# Patient Record
Sex: MALE | Race: WHITE | Employment: FULL TIME | URBAN - METROPOLITAN AREA
[De-identification: names, ages, dates, MRNs, and addresses within clinical notes are randomized per-mention and may not be internally consistent; named-entity substitution may affect disease eponyms.]

---

## 2022-09-05 ENCOUNTER — HOSPITAL ENCOUNTER (EMERGENCY)
Age: 37
Discharge: HOME | End: 2022-09-05
Attending: STUDENT IN AN ORGANIZED HEALTH CARE EDUCATION/TRAINING PROGRAM
Payer: COMMERCIAL

## 2022-09-05 ENCOUNTER — APPOINTMENT (OUTPATIENT)
Dept: CT IMAGING | Age: 37
End: 2022-09-05
Payer: COMMERCIAL

## 2022-09-05 VITALS
BODY MASS INDEX: 42.26 KG/M2 | HEART RATE: 80 BPM | OXYGEN SATURATION: 97 % | DIASTOLIC BLOOD PRESSURE: 91 MMHG | RESPIRATION RATE: 16 BRPM | HEIGHT: 72 IN | SYSTOLIC BLOOD PRESSURE: 150 MMHG | WEIGHT: 312 LBS | TEMPERATURE: 98.9 F

## 2022-09-05 DIAGNOSIS — N20.1 URETEROLITHIASIS: Primary | ICD-10-CM

## 2022-09-05 DIAGNOSIS — R16.1 SPLENOMEGALY: ICD-10-CM

## 2022-09-05 LAB
A/G RATIO: 1.6 (ref 1.1–2.2)
ALBUMIN SERPL-MCNC: 4.2 G/DL (ref 3.4–5)
ALP BLD-CCNC: 69 U/L (ref 40–129)
ALT SERPL-CCNC: 36 U/L (ref 10–40)
ANION GAP SERPL CALCULATED.3IONS-SCNC: 12 MMOL/L (ref 3–16)
AST SERPL-CCNC: 28 U/L (ref 15–37)
BASOPHILS ABSOLUTE: 0.1 K/UL (ref 0–0.2)
BASOPHILS RELATIVE PERCENT: 0.6 %
BILIRUB SERPL-MCNC: 0.8 MG/DL (ref 0–1)
BILIRUBIN URINE: NEGATIVE
BLOOD, URINE: ABNORMAL
BUN BLDV-MCNC: 16 MG/DL (ref 7–20)
CALCIUM SERPL-MCNC: 8.6 MG/DL (ref 8.3–10.6)
CHLORIDE BLD-SCNC: 102 MMOL/L (ref 99–110)
CLARITY: CLEAR
CO2: 24 MMOL/L (ref 21–32)
COLOR: YELLOW
CREAT SERPL-MCNC: 1.2 MG/DL (ref 0.9–1.3)
EOSINOPHILS ABSOLUTE: 0 K/UL (ref 0–0.6)
EOSINOPHILS RELATIVE PERCENT: 0.5 %
GFR AFRICAN AMERICAN: >60
GFR NON-AFRICAN AMERICAN: >60
GLUCOSE BLD-MCNC: 167 MG/DL (ref 70–99)
GLUCOSE URINE: NEGATIVE MG/DL
HCT VFR BLD CALC: 41 % (ref 40.5–52.5)
HEMOGLOBIN: 14.3 G/DL (ref 13.5–17.5)
KETONES, URINE: NEGATIVE MG/DL
LEUKOCYTE ESTERASE, URINE: NEGATIVE
LIPASE: 48 U/L (ref 13–60)
LYMPHOCYTES ABSOLUTE: 1.1 K/UL (ref 1–5.1)
LYMPHOCYTES RELATIVE PERCENT: 12.1 %
MCH RBC QN AUTO: 29.9 PG (ref 26–34)
MCHC RBC AUTO-ENTMCNC: 35 G/DL (ref 31–36)
MCV RBC AUTO: 85.6 FL (ref 80–100)
MICROSCOPIC EXAMINATION: YES
MONOCYTES ABSOLUTE: 0.5 K/UL (ref 0–1.3)
MONOCYTES RELATIVE PERCENT: 5.2 %
MUCUS: ABNORMAL /LPF
NEUTROPHILS ABSOLUTE: 7.4 K/UL (ref 1.7–7.7)
NEUTROPHILS RELATIVE PERCENT: 81.6 %
NITRITE, URINE: NEGATIVE
PDW BLD-RTO: 13.1 % (ref 12.4–15.4)
PH UA: 6 (ref 5–8)
PLATELET # BLD: 218 K/UL (ref 135–450)
PMV BLD AUTO: 8 FL (ref 5–10.5)
POTASSIUM REFLEX MAGNESIUM: 3.9 MMOL/L (ref 3.5–5.1)
PROTEIN UA: NEGATIVE MG/DL
RBC # BLD: 4.79 M/UL (ref 4.2–5.9)
RBC UA: ABNORMAL /HPF (ref 0–4)
SODIUM BLD-SCNC: 138 MMOL/L (ref 136–145)
SPECIFIC GRAVITY UA: <=1.005 (ref 1–1.03)
TOTAL PROTEIN: 6.8 G/DL (ref 6.4–8.2)
URINE TYPE: ABNORMAL
UROBILINOGEN, URINE: 0.2 E.U./DL
WBC # BLD: 9.1 K/UL (ref 4–11)
WBC UA: ABNORMAL /HPF (ref 0–5)

## 2022-09-05 PROCEDURE — 85025 COMPLETE CBC W/AUTO DIFF WBC: CPT

## 2022-09-05 PROCEDURE — 83690 ASSAY OF LIPASE: CPT

## 2022-09-05 PROCEDURE — 99285 EMERGENCY DEPT VISIT HI MDM: CPT

## 2022-09-05 PROCEDURE — 6360000004 HC RX CONTRAST MEDICATION: Performed by: STUDENT IN AN ORGANIZED HEALTH CARE EDUCATION/TRAINING PROGRAM

## 2022-09-05 PROCEDURE — 6360000002 HC RX W HCPCS: Performed by: STUDENT IN AN ORGANIZED HEALTH CARE EDUCATION/TRAINING PROGRAM

## 2022-09-05 PROCEDURE — 81001 URINALYSIS AUTO W/SCOPE: CPT

## 2022-09-05 PROCEDURE — 96375 TX/PRO/DX INJ NEW DRUG ADDON: CPT

## 2022-09-05 PROCEDURE — 80053 COMPREHEN METABOLIC PANEL: CPT

## 2022-09-05 PROCEDURE — 2580000003 HC RX 258: Performed by: STUDENT IN AN ORGANIZED HEALTH CARE EDUCATION/TRAINING PROGRAM

## 2022-09-05 PROCEDURE — 74177 CT ABD & PELVIS W/CONTRAST: CPT

## 2022-09-05 PROCEDURE — 96374 THER/PROPH/DIAG INJ IV PUSH: CPT

## 2022-09-05 RX ORDER — KETOROLAC TROMETHAMINE 30 MG/ML
15 INJECTION, SOLUTION INTRAMUSCULAR; INTRAVENOUS ONCE
Status: COMPLETED | OUTPATIENT
Start: 2022-09-05 | End: 2022-09-05

## 2022-09-05 RX ORDER — ONDANSETRON 2 MG/ML
4 INJECTION INTRAMUSCULAR; INTRAVENOUS EVERY 6 HOURS PRN
Status: DISCONTINUED | OUTPATIENT
Start: 2022-09-05 | End: 2022-09-05 | Stop reason: HOSPADM

## 2022-09-05 RX ORDER — ESOMEPRAZOLE MAGNESIUM 20 MG/1
20 FOR SUSPENSION ORAL DAILY
COMMUNITY

## 2022-09-05 RX ORDER — IBUPROFEN 600 MG/1
600 TABLET ORAL EVERY 6 HOURS PRN
Qty: 360 TABLET | Refills: 1 | Status: SHIPPED | OUTPATIENT
Start: 2022-09-05 | End: 2022-09-05

## 2022-09-05 RX ORDER — 0.9 % SODIUM CHLORIDE 0.9 %
1000 INTRAVENOUS SOLUTION INTRAVENOUS ONCE
Status: COMPLETED | OUTPATIENT
Start: 2022-09-05 | End: 2022-09-05

## 2022-09-05 RX ORDER — OXYCODONE HYDROCHLORIDE AND ACETAMINOPHEN 5; 325 MG/1; MG/1
1 TABLET ORAL EVERY 6 HOURS PRN
Qty: 20 TABLET | Refills: 0 | Status: SHIPPED | OUTPATIENT
Start: 2022-09-05 | End: 2022-09-10

## 2022-09-05 RX ORDER — IBUPROFEN 600 MG/1
600 TABLET ORAL EVERY 6 HOURS PRN
Qty: 360 TABLET | Refills: 1 | Status: SHIPPED | OUTPATIENT
Start: 2022-09-05

## 2022-09-05 RX ORDER — SERTRALINE HYDROCHLORIDE 25 MG/1
50 TABLET, FILM COATED ORAL DAILY
COMMUNITY
End: 2022-10-13 | Stop reason: DRUGHIGH

## 2022-09-05 RX ORDER — OXYCODONE HYDROCHLORIDE AND ACETAMINOPHEN 5; 325 MG/1; MG/1
1 TABLET ORAL EVERY 6 HOURS PRN
Qty: 20 TABLET | Refills: 0 | Status: SHIPPED | OUTPATIENT
Start: 2022-09-05 | End: 2022-09-05

## 2022-09-05 RX ADMIN — IOPAMIDOL 75 ML: 755 INJECTION, SOLUTION INTRAVENOUS at 08:10

## 2022-09-05 RX ADMIN — ONDANSETRON HYDROCHLORIDE 4 MG: 2 INJECTION, SOLUTION INTRAMUSCULAR; INTRAVENOUS at 07:43

## 2022-09-05 RX ADMIN — KETOROLAC TROMETHAMINE 15 MG: 30 INJECTION, SOLUTION INTRAMUSCULAR; INTRAVENOUS at 07:43

## 2022-09-05 RX ADMIN — SODIUM CHLORIDE 1000 ML: 9 INJECTION, SOLUTION INTRAVENOUS at 07:42

## 2022-09-05 ASSESSMENT — PAIN DESCRIPTION - DESCRIPTORS: DESCRIPTORS: STABBING

## 2022-09-05 ASSESSMENT — PAIN - FUNCTIONAL ASSESSMENT: PAIN_FUNCTIONAL_ASSESSMENT: 0-10

## 2022-09-05 ASSESSMENT — PAIN SCALES - GENERAL
PAINLEVEL_OUTOF10: 2
PAINLEVEL_OUTOF10: 7

## 2022-09-05 ASSESSMENT — PAIN DESCRIPTION - PAIN TYPE: TYPE: ACUTE PAIN

## 2022-09-05 ASSESSMENT — PAIN DESCRIPTION - LOCATION: LOCATION: FLANK

## 2022-09-05 ASSESSMENT — PAIN DESCRIPTION - ORIENTATION: ORIENTATION: LEFT

## 2022-09-05 NOTE — ED PROVIDER NOTES
Emergency Department Encounter    Patient: Arsen Redd  MRN: [id number]  : 1985  Date of Evaluation: 2022  ED Provider:  Ethel Castillo MD    Triage Chief Complaint:   Flank Pain (Left side flank pain onset 0500, denies urinary symptoms)    [place]:  Arsen Redd is a 44 y.o. male presenting with left flank pain started at 5 AM.  Patient states he was awoken from sleep at 5 AM with left flank pain. Denies any recent falls or trauma. States the pain is moderate severe, constant, stabbing, worse with palpation without relieving factors. Denies any urinary symptoms include dysuria, increased frequency, urgency, hematuria. Denies diarrhea or constipation or blood or melena stools. States he has had some nausea without vomiting. Denies fevers or chills. Denies alcohol or drug use. Denies any central back pain, radiation of pain in the lower extremities, bowel or bladder changes, motor or sensory changes or lower extremities and states it is not in his central spine but over the left flank. Denies any chest pain, shortness of breath, cough, sputum production. Denies headache, blurred vision, focal deficits or motor or sensory changes. ROS - see HPI, below listed is current ROS at time of my eval:  At least 14 systems reviewed, negative other HPI    No past medical history on file. No past surgical history on file. No family history on file.   Social History     Socioeconomic History    Marital status:      Spouse name: Not on file    Number of children: Not on file    Years of education: Not on file    Highest education level: Not on file   Occupational History    Not on file   Tobacco Use    Smoking status: Not on file    Smokeless tobacco: Not on file   Substance and Sexual Activity    Alcohol use: Not on file    Drug use: Not on file    Sexual activity: Not on file   Other Topics Concern    Not on file   Social History Narrative    Not on file     Social Determinants of Health Financial Resource Strain: Not on file   Food Insecurity: Not on file   Transportation Needs: Not on file   Physical Activity: Not on file   Stress: Not on file   Social Connections: Not on file   Intimate Partner Violence: Not on file   Housing Stability: Not on file     Current Facility-Administered Medications   Medication Dose Route Frequency Provider Last Rate Last Admin    0.9 % sodium chloride bolus  1,000 mL IntraVENous Once Hood Mcmillan MD        ketorolac (TORADOL) injection 15 mg  15 mg IntraVENous Once Hood Mcmillan MD         No current outpatient medications on file. Not on File    Nursing Notes Reviewed    Physical Exam:  Triage VS:    ED Triage Vitals [09/05/22 0728]   Enc Vitals Group      BP (!) 159/102      Heart Rate 75      Resp 18      Temp       Temp src       SpO2 99 %      Weight (!) 312 lb (141.5 kg)      Height 6' (1.829 m)      Head Circumference       Peak Flow       Pain Score       Pain Loc       Pain Edu? Excl. in [address]? My pulse ox interpretation is - normal    General appearance:  No acute distress. Skin:  Warm. Dry. Eye:  Extraocular movements intact. Ears, nose, mouth and throat:  Oral mucosa moist   Neck:  Trachea midline. Extremity:  No swelling. Normal ROM     Heart:  Regular rate and rhythm, normal S1 & S2, no extra heart sounds. Perfusion:  intact  Respiratory:  Lungs clear to auscultation bilaterally. Respirations nonlabored. Abdominal:  Normal bowel sounds. Soft. Palpation over the left flank with voluntary guarding without rebound, no CVA tenderness, no central spinal tenderness  Back:  No CVA tenderness to palpation tenderness palpation over the CT or L-spine, normal sensation light touch in the lower extremities, 5 out of 5 motor strength with hip extension flexion, extension of flexion of the knees as well as plantar flexion dorsiflexion, no saddle anesthesia  Neurological:  Alert and oriented times 3.   No focal neuro deficits. Psychiatric:  Appropriate    I have reviewed and interpreted all of the currently available lab results from this visit (if applicable):  No results found for this visit on 09/05/22. Radiographs (if obtained):  Radiologist's Report Reviewed:  No results found. MDM:    67-year-old male presenting with left flank pain. History and be seen above. Vitals on presentation are reassuring and patient afebrile satting well on room air. Physical exam as seen above. CBC, CMP, lipase, UA as well as CT abdomen pelvis obtained. Patient given fluids, Toradol and Zofran in the ED. CBC reveals no leukocytosis. Hemoglobin is within normal limits. CMP is overall reassuring. Lipase not elevated. UA shows red blood cells as fraction. CT showed stone limiting left-sided nephrogram with hydronephrosis 5 mm stone is seen recheck left J or splenomegaly without evidence of discrete splenic mass. There is no acute abdomen abdomen pelvis otherwise. I discussed spleen, 5 mm stone as well as fatty infiltration of the liver with patient. On reevaluation patient's pain significantly proved. I discussed straining urine as well as, follow-up with urology and primary doctor. Patient agreeable to plan and discharged home. Clinical Impression:  1. Ureterolithiasis    2. Splenomegaly            Comment: Please note this report has been produced using speech recognition software and may contain errors related to that system including errors in grammar, punctuation, and spelling, as well as words and phrases that may be inappropriate. Efforts were made to edit the dictations.         Anita Landry MD  09/10/22 8247

## 2022-09-05 NOTE — DISCHARGE INSTRUCTIONS
Follow-up with your urologist above. Call them tomorrow to set up follow-up appointment soon as able for reevaluation. Straight strain urine and if you to pass the stone keep stone intake to urology. Return to emergency department for uncontrolled pain, uncontrolled nausea vomiting, fevers, change in urination or any new changing or worsening symptoms.   We are always here for reevaluation never hesitate to return

## 2022-09-05 NOTE — ED NOTES
5789- Perfect serve sent to Dr. Zack Bland.       Dr. Zack Bland returned the call and spoke to Dr. Key Barney  09/05/22 Lily Guo  09/05/22 1029

## 2022-09-05 NOTE — ED TRIAGE NOTES
Chief Complaint   Patient presents with    Flank Pain     Left side flank pain onset 0500, denies urinary symptoms

## 2022-09-08 ENCOUNTER — OFFICE VISIT (OUTPATIENT)
Dept: PRIMARY CARE CLINIC | Age: 37
End: 2022-09-08
Payer: COMMERCIAL

## 2022-09-08 ENCOUNTER — HOSPITAL ENCOUNTER (OUTPATIENT)
Age: 37
Discharge: HOME | End: 2022-09-08
Payer: COMMERCIAL

## 2022-09-08 VITALS
OXYGEN SATURATION: 98 % | WEIGHT: 315 LBS | HEIGHT: 70 IN | HEART RATE: 89 BPM | TEMPERATURE: 97.4 F | DIASTOLIC BLOOD PRESSURE: 84 MMHG | BODY MASS INDEX: 45.1 KG/M2 | SYSTOLIC BLOOD PRESSURE: 136 MMHG

## 2022-09-08 DIAGNOSIS — Z00.00 HEALTHCARE MAINTENANCE: ICD-10-CM

## 2022-09-08 DIAGNOSIS — N20.0 KIDNEY STONE: Primary | ICD-10-CM

## 2022-09-08 DIAGNOSIS — E66.01 CLASS 3 SEVERE OBESITY DUE TO EXCESS CALORIES WITHOUT SERIOUS COMORBIDITY WITH BODY MASS INDEX (BMI) OF 45.0 TO 49.9 IN ADULT (HCC): ICD-10-CM

## 2022-09-08 DIAGNOSIS — F41.9 ANXIETY: ICD-10-CM

## 2022-09-08 LAB
CHOLESTEROL, TOTAL: 165 MG/DL (ref 0–199)
HDLC SERPL-MCNC: 36 MG/DL (ref 40–60)
LDL CHOLESTEROL CALCULATED: 98 MG/DL
TRIGL SERPL-MCNC: 153 MG/DL (ref 0–150)
VLDLC SERPL CALC-MCNC: 31 MG/DL

## 2022-09-08 PROCEDURE — 80061 LIPID PANEL: CPT

## 2022-09-08 PROCEDURE — 86787 VARICELLA-ZOSTER ANTIBODY: CPT

## 2022-09-08 PROCEDURE — 36415 COLL VENOUS BLD VENIPUNCTURE: CPT

## 2022-09-08 PROCEDURE — 83036 HEMOGLOBIN GLYCOSYLATED A1C: CPT

## 2022-09-08 PROCEDURE — 99204 OFFICE O/P NEW MOD 45 MIN: CPT

## 2022-09-08 RX ORDER — TAMSULOSIN HYDROCHLORIDE 0.4 MG/1
0.4 CAPSULE ORAL DAILY
Qty: 30 CAPSULE | Refills: 0 | Status: SHIPPED | OUTPATIENT
Start: 2022-09-08

## 2022-09-08 ASSESSMENT — ANXIETY QUESTIONNAIRES
7. FEELING AFRAID AS IF SOMETHING AWFUL MIGHT HAPPEN: 0
6. BECOMING EASILY ANNOYED OR IRRITABLE: 0
GAD7 TOTAL SCORE: 1
3. WORRYING TOO MUCH ABOUT DIFFERENT THINGS: 0
2. NOT BEING ABLE TO STOP OR CONTROL WORRYING: 0
IF YOU CHECKED OFF ANY PROBLEMS ON THIS QUESTIONNAIRE, HOW DIFFICULT HAVE THESE PROBLEMS MADE IT FOR YOU TO DO YOUR WORK, TAKE CARE OF THINGS AT HOME, OR GET ALONG WITH OTHER PEOPLE: NOT DIFFICULT AT ALL
5. BEING SO RESTLESS THAT IT IS HARD TO SIT STILL: 0
4. TROUBLE RELAXING: 1
1. FEELING NERVOUS, ANXIOUS, OR ON EDGE: 0

## 2022-09-08 ASSESSMENT — ENCOUNTER SYMPTOMS
COUGH: 0
SINUS PRESSURE: 0
NAUSEA: 0
ABDOMINAL PAIN: 0
SHORTNESS OF BREATH: 0
CONSTIPATION: 0
DIARRHEA: 0
CHEST TIGHTNESS: 0
BLOOD IN STOOL: 0
EYE PAIN: 0
VOMITING: 0
RHINORRHEA: 0
BACK PAIN: 1

## 2022-09-08 ASSESSMENT — PATIENT HEALTH QUESTIONNAIRE - PHQ9
7. TROUBLE CONCENTRATING ON THINGS, SUCH AS READING THE NEWSPAPER OR WATCHING TELEVISION: 1
SUM OF ALL RESPONSES TO PHQ QUESTIONS 1-9: 7
3. TROUBLE FALLING OR STAYING ASLEEP: 2
1. LITTLE INTEREST OR PLEASURE IN DOING THINGS: 1
4. FEELING TIRED OR HAVING LITTLE ENERGY: 1
5. POOR APPETITE OR OVEREATING: 2
2. FEELING DOWN, DEPRESSED OR HOPELESS: 0
6. FEELING BAD ABOUT YOURSELF - OR THAT YOU ARE A FAILURE OR HAVE LET YOURSELF OR YOUR FAMILY DOWN: 0
SUM OF ALL RESPONSES TO PHQ QUESTIONS 1-9: 7
8. MOVING OR SPEAKING SO SLOWLY THAT OTHER PEOPLE COULD HAVE NOTICED. OR THE OPPOSITE, BEING SO FIGETY OR RESTLESS THAT YOU HAVE BEEN MOVING AROUND A LOT MORE THAN USUAL: 0
10. IF YOU CHECKED OFF ANY PROBLEMS, HOW DIFFICULT HAVE THESE PROBLEMS MADE IT FOR YOU TO DO YOUR WORK, TAKE CARE OF THINGS AT HOME, OR GET ALONG WITH OTHER PEOPLE: 0
SUM OF ALL RESPONSES TO PHQ9 QUESTIONS 1 & 2: 1
SUM OF ALL RESPONSES TO PHQ QUESTIONS 1-9: 7
SUM OF ALL RESPONSES TO PHQ QUESTIONS 1-9: 7
9. THOUGHTS THAT YOU WOULD BE BETTER OFF DEAD, OR OF HURTING YOURSELF: 0

## 2022-09-08 NOTE — PROGRESS NOTES
[address] and [facility]                                             [address] [facility],  [facility], [address] [place] [id number]        Phone: [phone]                                     Name:  Rossy Sánchez  :    1985      Consultants:   Patient Care Team:  Johann Sandoval DO as PCP - General (Family Medicine)    Chief Complaint:     Rossy Sánchez is a 44 y.o. male  who presents today for a New Patient care visit with Personalized Prevention Plan Services as noted below. HPI:    Rossy Sánchez is a 45 yo male who presents today to establish care. Patient referred here to ED for kidney stone. States he woke up Monday morning with left back/flank pain. Attempted to fall back asleep the pain was so severe that he ended up having to go to the emergency department. Found to have 5 mm stone in left UVJ. Was given ibuprofen and Percocet for pain. Continues to have left flank pain and states he spent much of the past few days in bed. Does feel pain is moving lower which he hopes means stone is moving down. Thinks he passed a small portion but not gallstone. Denies any abdominal pain, dysuria, or hematuria. Reports rare alcohol use although did drink more than usual 2 weeks ago at his high school reunion. Does feel he drinks a lot of tea each day. Reports he has never had a kidney stone before but after on his family has had multiple stones. Reports history of anxiety for which he takes Zoloft 50 mg daily. States he was able to get it filled from a website but is interested in having future prescriptions filled through our office. He has anxiety is well controlled on medication. No concern or complaint today. Reports he has been trying to lose weight recently, so plans to work on diet and exercise.   Feels he gained a lot of weight after ankle injury a few years ago and has had trouble getting weight back off.      -Previously screened negative for HIV and HCV  -Reports he had chickenpox as a child, unaware of immune status  -Eligible for flu shot, reports he does not usually get it but he feels he gets more sick after vaccine then in years he has not had it  -Eligible for COVID booster    Detailed past medical, surgical, family, and social history detailed below. Patient Active Problem List   Diagnosis    Anxiety         Past Medical History:    Past Medical History:   Diagnosis Date    Anxiety     Heartburn        Past Surgical History:  Past Surgical History:   Procedure Laterality Date    ANKLE SURGERY Left 12/26/2019       Home Meds:  Prior to Visit Medications    Medication Sig Taking? Authorizing Provider   tamsulosin (FLOMAX) 0.4 MG capsule Take 1 capsule by mouth daily Yes Armaan Porras,    sertraline (ZOLOFT) 25 MG tablet Take 50 mg by mouth daily Yes Historical Provider, MD   esomeprazole Magnesium (NEXIUM) 20 MG PACK Take 20 mg by mouth daily Yes Historical Provider, MD   ibuprofen (ADVIL;MOTRIN) 600 MG tablet Take 1 tablet by mouth every 6 hours as needed for Pain Yes Archana Cline MD   oxyCODONE-acetaminophen (PERCOCET) 5-325 MG per tablet Take 1 tablet by mouth every 6 hours as needed for Pain for up to 5 days. Intended supply: 3 days. Take lowest dose possible to manage pain Yes Archana Cline MD       Allergies:    Patient has no known allergies.     Family History:       Problem Relation Age of Onset    Heart Failure Mother     High Blood Pressure Mother     Diabetes Father     Stroke Father     High Blood Pressure Father        Social History:  Social History       Tobacco History       Smoking Status  Never      Smokeless Tobacco Use  Never              Alcohol History       Alcohol Use Status  Yes Comment  rarely              Drug Use       Drug Use Status  Never              Sexual Activity       Sexually Active  Not Asked                       Health Maintenance Completed:  Health Maintenance   Topic Date Due    Varicella vaccine (1 of 2 - 2-dose childhood series) Never done    Diabetes screen  Never done    COVID-19 Vaccine (3 - Booster for Pfizer series) 10/11/2021    Flu vaccine (1) Never done    Depression Screen  09/08/2023    DTaP/Tdap/Td vaccine (7 - Td or Tdap) 12/31/2027    Hepatitis B vaccine  Completed    Hib vaccine  Completed    Hepatitis A vaccine  Aged Out    Meningococcal (ACWY) vaccine  Aged Out    Pneumococcal 0-64 years Vaccine  Aged Out    Hepatitis C screen  Discontinued    HIV screen  Discontinued          Immunization History   Administered Date(s) Administered    COVID-19, PFIZER PURPLE top, DILUTE for use, (age 15 y+), 30mcg/0.3mL 04/20/2021, 05/11/2021    DTP 1985, 1985, 1985, 10/21/1985    Hepatitis B Ped/Adol (Engerix-B, Recombivax HB) 08/17/1996, 10/07/1999, 02/03/2000    Hib vaccine 11/13/1986    MMR 10/21/1985, 08/17/1996    Polio OPV 1984, 1984, 10/21/1985, 08/17/1996    Td vaccine (adult) 08/17/1996    Tdap (Boostrix, Adacel) 12/31/2017         Review of Systems:  Review of Systems   Constitutional:  Negative for activity change, appetite change, fatigue and fever. HENT:  Negative for congestion, rhinorrhea and sinus pressure. Eyes:  Negative for pain and visual disturbance. Respiratory:  Negative for cough, chest tightness and shortness of breath. Cardiovascular:  Negative for chest pain, palpitations and leg swelling. Gastrointestinal:  Negative for abdominal pain, blood in stool, constipation, diarrhea, nausea and vomiting. Endocrine: Negative for polydipsia and polyuria. Genitourinary:  Positive for flank pain. Negative for dysuria and frequency. Musculoskeletal:  Positive for back pain. Negative for myalgias. Neurological:  Negative for dizziness and light-headedness. Psychiatric/Behavioral:  Negative for dysphoric mood. The patient is not nervous/anxious.        Physical Exam: Vitals:    09/08/22 1056   BP: 136/84   Site: Left Upper Arm   Position: Sitting   Cuff Size: Medium Adult   Pulse: 89   Temp: 97.4 °F (36.3 °C)   TempSrc: Temporal   SpO2: 98%   Weight: (!) 324 lb (147 kg)   Height: 5' 10.04\" (1.779 m)     Body mass index is 46.44 kg/m². Wt Readings from Last 3 Encounters:   09/08/22 (!) 324 lb (147 kg)   09/05/22 (!) 312 lb (141.5 kg)       BP Readings from Last 3 Encounters:   09/08/22 136/84   09/05/22 (!) 150/91       Physical Exam  Vitals reviewed. Constitutional:       General: He is not in acute distress. Appearance: Normal appearance. HENT:      Head: Normocephalic and atraumatic. Right Ear: Tympanic membrane, ear canal and external ear normal.      Left Ear: Tympanic membrane, ear canal and external ear normal.      Nose: Nose normal.      Mouth/Throat:      Mouth: Mucous membranes are moist.      Pharynx: Oropharynx is clear. Eyes:      Extraocular Movements: Extraocular movements intact. Pupils: Pupils are equal, round, and reactive to light. Neck:      Thyroid: No thyroid mass or thyromegaly. Cardiovascular:      Rate and Rhythm: Normal rate and regular rhythm. Pulses: Normal pulses. Heart sounds: Normal heart sounds. Pulmonary:      Effort: Pulmonary effort is normal. No respiratory distress. Breath sounds: Normal breath sounds. Abdominal:      General: Bowel sounds are normal.      Palpations: Abdomen is soft. Tenderness: There is no abdominal tenderness. There is left CVA tenderness. There is no guarding. Musculoskeletal:         General: No deformity. Cervical back: Neck supple. No tenderness. Right lower leg: No edema. Left lower leg: No edema. Lymphadenopathy:      Cervical: No cervical adenopathy. Skin:     General: Skin is warm and dry. Capillary Refill: Capillary refill takes less than 2 seconds. Findings: No lesion or rash.    Neurological:      General: No focal deficit present. Mental Status: He is alert. Mental status is at baseline. Motor: No weakness. Gait: Gait normal.   Psychiatric:         Mood and Affect: Mood normal.         Behavior: Behavior normal.         Thought Content:  Thought content normal.         Judgment: Judgment normal.            Lab Review:   Admission on 09/05/2022, Discharged on 09/05/2022   Component Date Value    WBC 09/05/2022 9.1     RBC 09/05/2022 4.79     Hemoglobin 09/05/2022 14.3     Hematocrit 09/05/2022 41.0     MCV 09/05/2022 85.6     MCH 09/05/2022 29.9     MCHC 09/05/2022 35.0     RDW 09/05/2022 13.1     Platelets 39/73/3049 218     MPV 09/05/2022 8.0     Neutrophils % 09/05/2022 81.6     Lymphocytes % 09/05/2022 12.1     Monocytes % 09/05/2022 5.2     Eosinophils % 09/05/2022 0.5     Basophils % 09/05/2022 0.6     Neutrophils Absolute 09/05/2022 7.4     Lymphocytes Absolute 09/05/2022 1.1     Monocytes Absolute 09/05/2022 0.5     Eosinophils Absolute 09/05/2022 0.0     Basophils Absolute 09/05/2022 0.1     Sodium 09/05/2022 138     Potassium reflex Magnesi* 09/05/2022 3.9     Chloride 09/05/2022 102     CO2 09/05/2022 24     Anion Gap 09/05/2022 12     Glucose 09/05/2022 167 (A)    BUN 09/05/2022 16     Creatinine 09/05/2022 1.2     GFR Non- 09/05/2022 >60     GFR  09/05/2022 >60     Calcium 09/05/2022 8.6     Total Protein 09/05/2022 6.8     Albumin 09/05/2022 4.2     Albumin/Globulin Ratio 09/05/2022 1.6     Total Bilirubin 09/05/2022 0.8     Alkaline Phosphatase 09/05/2022 69     ALT 09/05/2022 36     AST 09/05/2022 28     Lipase 09/05/2022 48.0     Color, UA 09/05/2022 Yellow     Clarity, UA 09/05/2022 Clear     Glucose, Ur 09/05/2022 Negative     Bilirubin Urine 09/05/2022 Negative     Ketones, Urine 09/05/2022 Negative     Specific Gravity, UA 09/05/2022 <=1.005     Blood, Urine 09/05/2022 LARGE (A)    pH, UA 09/05/2022 6.0     Protein, UA 09/05/2022 Negative     Urobilinogen, Urine 09/05/2022 0.2     Nitrite, Urine 09/05/2022 Negative     Leukocyte Esterase, Urine 09/05/2022 Negative     Microscopic Examination 09/05/2022 YES     Urine Type 09/05/2022 NotGiven     Mucus, UA 09/05/2022 Rare (A)    WBC, UA 09/05/2022 3-5     RBC, UA 09/05/2022 21-50 (A)          Assessment/Plan:  Denzel Petty was seen today for follow-up. Diagnoses and all orders for this visit:    Kidney stone  Not at goal.  Tamsulosin given to help with stone passage. Continue taking Percocet until he runs out. Instructed he may increase ibuprofen to maximum of 800 mg every 6-8 hours, max with him on dosage of 3.2 g/day. Encourage patient to try to collect stone at time of passage so that we might send it off for analysis to see if he is eligible for a preventative medication. Increase fluid intake to 2 to 3 L daily to help with stone passage and to prevent further stone formation. Instructed patient to go to ED if he develops a fever to rule out pyelonephritis or urosepsis. Patient expressed understanding and agreement. Follow-up if symptoms worsen or fail to improve. -     tamsulosin (FLOMAX) 0.4 MG capsule; Take 1 capsule by mouth daily    Anxiety  Well-controlled, at goal.  Continue taking Zoloft 50 mg daily. Follow-up in 4 months. Class 3 severe obesity due to excess calories without serious comorbidity with body mass index (BMI) of 45.0 to 49.9 in adult [facility])  Not at goal.  Referral to weight management given today. Screening lipid panel and A1c ordered today. - recommend 150 min of moderate intensity or 75 min of high intensity cardiovascular activity a week or 10-15,000 steps a day, with 2 days of weightbearing exercises per week   - dietary wise, try to stick to your weight x 10 in calories a day  - avoid processed/refined carbohydrates (boxed/canned/frozen/fast)  - encourage healthy protein and fat, including butter, avocado, egg   -     Hemoglobin A1C; Future  -     Lipid Panel;  Future  -     [facility]  Varicella titers ordered today. Consider booster if not immune. -     Varicella Zoster Antibody, IgG; Future      Health Maintenance Due:  Health Maintenance Due   Topic Date Due    Varicella vaccine (1 of 2 - 2-dose childhood series) Never done    Diabetes screen  Never done    COVID-19 Vaccine (3 - Booster for Pfizer series) 10/11/2021    Flu vaccine (1) Never done        Health care decision maker:  <72years old      Health Maintenance: (USPSTF Recommendations)  HIV Screen: (16-65 yr old, and all pregnant patients (A)): ordered today   Hep C Screen: (18-79 yr old (B)): ordered today   Immunizations: declined flu shot today; plans to wait for updated COVID booster    RTC:  Return in about 4 months (around 1/8/2023). EMR Dragon/transcription disclaimer:  Much of this encounter note is electronic transcription/translation of spoken language to printed texts. The electronic translation of spoken language may be erroneous, or at times, nonsensical words or phrases may be inadvertently transcribed.   Although I have reviewed the note for such errors, some may still exist.         Ayanna Vora DO, PGY-2  [facility]

## 2022-09-09 LAB
ESTIMATED AVERAGE GLUCOSE: 105.4 MG/DL
HBA1C MFR BLD: 5.3 %
VARICELLA-ZOSTER VIRUS AB, IGG: NORMAL

## 2023-02-09 ENCOUNTER — OFFICE VISIT (OUTPATIENT)
Dept: PRIMARY CARE CLINIC | Age: 38
End: 2023-02-09
Payer: COMMERCIAL

## 2023-02-09 VITALS
BODY MASS INDEX: 45.1 KG/M2 | TEMPERATURE: 97.6 F | HEIGHT: 70 IN | DIASTOLIC BLOOD PRESSURE: 82 MMHG | SYSTOLIC BLOOD PRESSURE: 136 MMHG | HEART RATE: 96 BPM | OXYGEN SATURATION: 99 % | RESPIRATION RATE: 16 BRPM | WEIGHT: 315 LBS

## 2023-02-09 DIAGNOSIS — R68.2 DRY MOUTH: ICD-10-CM

## 2023-02-09 DIAGNOSIS — F41.9 ANXIETY: ICD-10-CM

## 2023-02-09 DIAGNOSIS — E66.01 CLASS 3 SEVERE OBESITY DUE TO EXCESS CALORIES WITHOUT SERIOUS COMORBIDITY WITH BODY MASS INDEX (BMI) OF 45.0 TO 49.9 IN ADULT (HCC): ICD-10-CM

## 2023-02-09 DIAGNOSIS — Z87.442 HISTORY OF RENAL STONE: Primary | ICD-10-CM

## 2023-02-09 DIAGNOSIS — N28.1 KIDNEY CYSTS: ICD-10-CM

## 2023-02-09 PROBLEM — E66.813 CLASS 3 SEVERE OBESITY DUE TO EXCESS CALORIES WITHOUT SERIOUS COMORBIDITY WITH BODY MASS INDEX (BMI) OF 45.0 TO 49.9 IN ADULT: Status: ACTIVE | Noted: 2023-02-09

## 2023-02-09 PROCEDURE — 99214 OFFICE O/P EST MOD 30 MIN: CPT | Performed by: STUDENT IN AN ORGANIZED HEALTH CARE EDUCATION/TRAINING PROGRAM

## 2023-02-09 RX ORDER — ALBUTEROL SULFATE 90 UG/1
AEROSOL, METERED RESPIRATORY (INHALATION)
COMMUNITY
Start: 2022-11-09

## 2023-02-09 RX ORDER — SEMAGLUTIDE 1.34 MG/ML
0.25 INJECTION, SOLUTION SUBCUTANEOUS WEEKLY
Qty: 3 ADJUSTABLE DOSE PRE-FILLED PEN SYRINGE | Refills: 0 | Status: SHIPPED | OUTPATIENT
Start: 2023-02-09

## 2023-02-09 RX ORDER — AZITHROMYCIN 250 MG/1
TABLET, FILM COATED ORAL
COMMUNITY
Start: 2022-11-09 | End: 2023-02-09

## 2023-02-09 SDOH — ECONOMIC STABILITY: FOOD INSECURITY: WITHIN THE PAST 12 MONTHS, YOU WORRIED THAT YOUR FOOD WOULD RUN OUT BEFORE YOU GOT MONEY TO BUY MORE.: NEVER TRUE

## 2023-02-09 SDOH — ECONOMIC STABILITY: FOOD INSECURITY: WITHIN THE PAST 12 MONTHS, THE FOOD YOU BOUGHT JUST DIDN'T LAST AND YOU DIDN'T HAVE MONEY TO GET MORE.: NEVER TRUE

## 2023-02-09 SDOH — ECONOMIC STABILITY: HOUSING INSECURITY
IN THE LAST 12 MONTHS, WAS THERE A TIME WHEN YOU DID NOT HAVE A STEADY PLACE TO SLEEP OR SLEPT IN A SHELTER (INCLUDING NOW)?: NO

## 2023-02-09 SDOH — ECONOMIC STABILITY: INCOME INSECURITY: HOW HARD IS IT FOR YOU TO PAY FOR THE VERY BASICS LIKE FOOD, HOUSING, MEDICAL CARE, AND HEATING?: NOT HARD AT ALL

## 2023-02-09 ASSESSMENT — PATIENT HEALTH QUESTIONNAIRE - PHQ9
SUM OF ALL RESPONSES TO PHQ QUESTIONS 1-9: 3
6. FEELING BAD ABOUT YOURSELF - OR THAT YOU ARE A FAILURE OR HAVE LET YOURSELF OR YOUR FAMILY DOWN: 0
SUM OF ALL RESPONSES TO PHQ QUESTIONS 1-9: 3
SUM OF ALL RESPONSES TO PHQ9 QUESTIONS 1 & 2: 0
7. TROUBLE CONCENTRATING ON THINGS, SUCH AS READING THE NEWSPAPER OR WATCHING TELEVISION: 0
2. FEELING DOWN, DEPRESSED OR HOPELESS: 0
9. THOUGHTS THAT YOU WOULD BE BETTER OFF DEAD, OR OF HURTING YOURSELF: 0
10. IF YOU CHECKED OFF ANY PROBLEMS, HOW DIFFICULT HAVE THESE PROBLEMS MADE IT FOR YOU TO DO YOUR WORK, TAKE CARE OF THINGS AT HOME, OR GET ALONG WITH OTHER PEOPLE: 0
3. TROUBLE FALLING OR STAYING ASLEEP: 1
1. LITTLE INTEREST OR PLEASURE IN DOING THINGS: 0
8. MOVING OR SPEAKING SO SLOWLY THAT OTHER PEOPLE COULD HAVE NOTICED. OR THE OPPOSITE, BEING SO FIGETY OR RESTLESS THAT YOU HAVE BEEN MOVING AROUND A LOT MORE THAN USUAL: 0
4. FEELING TIRED OR HAVING LITTLE ENERGY: 1
5. POOR APPETITE OR OVEREATING: 1

## 2023-02-09 ASSESSMENT — ANXIETY QUESTIONNAIRES
4. TROUBLE RELAXING: 0
5. BEING SO RESTLESS THAT IT IS HARD TO SIT STILL: 0
GAD7 TOTAL SCORE: 0
IF YOU CHECKED OFF ANY PROBLEMS ON THIS QUESTIONNAIRE, HOW DIFFICULT HAVE THESE PROBLEMS MADE IT FOR YOU TO DO YOUR WORK, TAKE CARE OF THINGS AT HOME, OR GET ALONG WITH OTHER PEOPLE: NOT DIFFICULT AT ALL
2. NOT BEING ABLE TO STOP OR CONTROL WORRYING: 0
7. FEELING AFRAID AS IF SOMETHING AWFUL MIGHT HAPPEN: 0
3. WORRYING TOO MUCH ABOUT DIFFERENT THINGS: 0
1. FEELING NERVOUS, ANXIOUS, OR ON EDGE: 0
6. BECOMING EASILY ANNOYED OR IRRITABLE: 0

## 2023-02-09 ASSESSMENT — ENCOUNTER SYMPTOMS
SORE THROAT: 0
VOMITING: 0
EYE PAIN: 0
SHORTNESS OF BREATH: 0
SINUS PAIN: 0
NAUSEA: 0
COUGH: 0

## 2023-02-09 NOTE — ASSESSMENT & PLAN NOTE
Unclear control, lifestyle modifications recommended   May be related to recent illness vs dry winter air  - Oral hygiene discussed  - Recommend rinsing mouth after acidic foods/ drinks or carbohydrate containing food drink to reduce risk of cavities  - OTC mouth wash for dry mouth may be beneficial  - Humidifier at night may be beneficial

## 2023-02-09 NOTE — ASSESSMENT & PLAN NOTE
resolved   He thinks he has the stone at home.  Recommend bringing it in.  - If recurrent stone consider preventative medication  - Recommend hydration, limit excessive vitamin C intake, limit soda   - f/u 3-6 mo

## 2023-02-09 NOTE — ASSESSMENT & PLAN NOTE
Uncontrolled, lifestyle modifications recommended   -Trial semaglutide if covered by insurance  -Recommend 150 minutes of cardiovascular activity a week, moderate intensity  -Increase  intake of fruits and vegetables  -Minimize packaged foods  - If able to start medication will need dose adjustment in 4 weeks if well tolerated

## 2023-02-09 NOTE — PROGRESS NOTES
[address] and [facility]                                             [facility], [address] [place], [place] [address], [address] [place] [address]        Phone: [phone]      Name:  Ligia Mack  :    1985    Consultants:   Patient Care Team:  Zane Paz DO as PCP - General (Family Medicine)    Chief Complaint:     Ligia Mack is a 44 y.o. male  who presents today for an established patient care visit with Personalized Prevention Plan Services as noted below. HPI:     Ligia Mack 44 y.o. male has Anxiety; History of renal stone; Class 3 severe obesity due to excess calories without serious comorbidity with body mass index (BMI) of 45.0 to 49.9 in [facility]); Dry mouth; and Kidney cysts on their problem list.      He thinks he may have had COVID 2 weeks ago. All symptoms have resolved other than dry throat and altered taste. He feels that his eyes are also dry. No sinus congestion. No complaint of respiratory symptoms. Hx Renal Stone  No longer taking Tamsulosin. No urinary symptoms. He thinks he collected the stone but did not bring it in. No hx of gout. No abdominal pain or hematuria. He takes a multivitamin. Anxiety   He is taking sertraline 50 mg since 10/22. He feels that this is working well for him. Patient tolerating medication(s) well without adverse effects. ABHISHEK 7 SCORE 2023   ABHISHEK-7 Total Score 0 1     PHQ Scores 2023   PHQ2 Score 0 1   PHQ9 Score 3 7     Obesity  He is working on lifestyle changes. He has lost 4lbs since his last appointment. He has been doing intermittent fasting. He is also trying to be more active.        Patient Active Problem List   Diagnosis    Anxiety    History of renal stone    Class 3 severe obesity due to excess calories without serious comorbidity with body mass index (BMI) of 45.0 to 49.9 in [facility])    Dry mouth    Kidney cysts         Past Medical History:    Past Medical History:   Diagnosis Date    Anxiety     Chronic back pain     Heartburn        Past Surgical History:  Past Surgical History:   Procedure Laterality Date    ANKLE SURGERY Left 12/26/2019       Home Meds:  Prior to Visit Medications    Medication Sig Taking? Authorizing Provider   albuterol sulfate HFA (PROVENTIL;VENTOLIN;PROAIR) 108 (90 Base) MCG/ACT inhaler INHALE 2 PUFFS UP TO 4 TIMES/DAY IF NEEDED FOR QUICK RELIEF ONLY. MAY TAKE 1-2 PUFFS BEFORE EXERCISE Yes Historical Provider, MD   Semaglutide,0.25 or 0.5MG/DOS, (OZEMPIC, 0.25 OR 0.5 MG/DOSE,) 2 MG/1.5ML SOPN Inject 0.25 mg into the skin once a week Yes Komal Cleaning, DO   sertraline (ZOLOFT) 50 MG tablet Take 1 tablet by mouth daily Yes Jakub Hernandez DO   esomeprazole Magnesium (NEXIUM) 20 MG PACK Take 20 mg by mouth daily Yes Historical Provider, MD   tamsulosin (FLOMAX) 0.4 MG capsule Take 1 capsule by mouth daily  Jakub Hernandez DO   ibuprofen (ADVIL;MOTRIN) 600 MG tablet Take 1 tablet by mouth every 6 hours as needed for Pain  Jacobo Lopez MD       Allergies:    Patient has no known allergies.     Family History:       Problem Relation Age of Onset    Heart Failure Mother     High Blood Pressure Mother     Diabetes Father     Stroke Father     High Blood Pressure Father          Health Maintenance Completed:  Health Maintenance   Topic Date Due    COVID-19 Vaccine (3 - Booster for Pfizer series) 07/06/2021    Flu vaccine (1) Never done    Depression Screen  09/08/2023    Diabetes screen  09/08/2025    DTaP/Tdap/Td vaccine (7 - Td or Tdap) 12/31/2027    Hib vaccine  Completed    Hepatitis A vaccine  Aged Out    Meningococcal (ACWY) vaccine  Aged Out    Pneumococcal 0-64 years Vaccine  Aged Out    Varicella vaccine  Discontinued    Hepatitis C screen  Discontinued    HIV screen  Discontinued          Immunization History   Administered Date(s) Administered    COVID-19, PFIZER PURPLE top, DILUTE for use, (age 15 y+), 30mcg/0.3mL 04/20/2021, 05/11/2021    DTP 1985, 1985, 1985, 10/21/1985    Hepatitis B Ped/Adol (Engerix-B, Recombivax HB) 08/17/1996, 10/07/1999, 02/03/2000    Hib vaccine 11/13/1986    MMR 10/21/1985, 08/17/1996    Polio OPV 1984, 1984, 10/21/1985, 08/17/1996    Td vaccine (adult) 08/17/1996    Tdap (Boostrix, Adacel) 12/31/2017         Review of Systems:  Review of Systems   Constitutional:  Negative for activity change and appetite change. HENT:  Negative for congestion, sinus pain and sore throat. Eyes:  Negative for pain. Respiratory:  Negative for cough and shortness of breath. Cardiovascular:  Negative for chest pain. Gastrointestinal:  Negative for nausea and vomiting. Genitourinary:  Negative for difficulty urinating. Musculoskeletal:  Negative for arthralgias and myalgias. Neurological:  Negative for dizziness, weakness, light-headedness and headaches. Psychiatric/Behavioral:  Negative for agitation and behavioral problems. Physical Exam:   Vitals:    02/09/23 1005   BP: 136/82   Site: Left Upper Arm   Position: Sitting   Cuff Size: Large Adult   Pulse: 96   Resp: 16   Temp: 97.6 °F (36.4 °C)   TempSrc: Temporal   SpO2: 99%   Weight: (!) 320 lb 12.8 oz (145.5 kg)   Height: 5' 10.04\" (1.779 m)     Body mass index is 45.98 kg/m². Wt Readings from Last 3 Encounters:   02/09/23 (!) 320 lb 12.8 oz (145.5 kg)   09/08/22 (!) 324 lb (147 kg)   09/05/22 (!) 312 lb (141.5 kg)       BP Readings from Last 3 Encounters:   02/09/23 136/82   09/08/22 136/84   09/05/22 (!) 150/91       Physical Exam  Constitutional:       Appearance: Normal appearance. HENT:      Head: Normocephalic and atraumatic. Eyes:      Extraocular Movements: Extraocular movements intact. Conjunctiva/sclera: Conjunctivae normal.   Cardiovascular:      Rate and Rhythm: Normal rate and regular rhythm. Pulses: Normal pulses. Heart sounds: Normal heart sounds.    Pulmonary: Effort: Pulmonary effort is normal.      Breath sounds: Normal breath sounds. Musculoskeletal:         General: Normal range of motion. Skin:     General: Skin is warm and dry. Capillary Refill: Capillary refill takes less than 2 seconds. Neurological:      General: No focal deficit present. Mental Status: He is alert and oriented to person, place, and time. Psychiatric:         Mood and Affect: Mood normal.         Behavior: Behavior normal.            Lab Review: pertinent labs reviewed       Assessment/Plan:  John Grant 44 y.o. male has Anxiety; History of renal stone; Class 3 severe obesity due to excess calories without serious comorbidity with body mass index (BMI) of 45.0 to 49.9 in [facility]);  Dry mouth; and Kidney cysts on their problem list.   Problem List       Anxiety      Well-controlled, continue current medications   Likely related to recent divorce   - f/u 3-6 month         Relevant Medications    sertraline (ZOLOFT) 50 MG tablet    Class 3 severe obesity due to excess calories without serious comorbidity with body mass index (BMI) of 45.0 to 49.9 in [facility])      Uncontrolled, lifestyle modifications recommended   -Trial semaglutide if covered by insurance  -Recommend 150 minutes of cardiovascular activity a week, moderate intensity  -Increase  intake of fruits and vegetables  -Minimize packaged foods  - If able to start medication will need dose adjustment in 4 weeks if well tolerated             Relevant Medications    Semaglutide,0.25 or 0.5MG/DOS, (OZEMPIC, 0.25 OR 0.5 MG/DOSE,) 2 MG/1.5ML SOPN    Dry mouth      Unclear control, lifestyle modifications recommended   May be related to recent illness vs dry winter air  - Oral hygiene discussed  - Recommend rinsing mouth after acidic foods/ drinks or carbohydrate containing food drink to reduce risk of cavities  - OTC mouth wash for dry mouth may be beneficial  - Humidifier at night may be beneficial           History of renal stone - Primary      resolved   He thinks he has the stone at home. Recommend bringing it in.  - If recurrent stone consider preventative medication  - Recommend hydration, limit excessive vitamin C intake, limit soda   - f/u 3-6 mo          Kidney cysts      Unclear control, unclear etiology   - Multiple renal cysts were seen on CT, low suspicion for polycystic kidney disease. Consider repeat urinalysis and CMP at follow up. If asymptomatic hematuria or increased cr consider further work up for PCKD. Genetic testing is not done routinely for screening. Ultrasound is the initial imaging modality but MRI may be considered if body habitus limits US. Declined vaccines today    Health Maintenance Due:  Health Maintenance Due   Topic Date Due    COVID-19 Vaccine (3 - Booster for Pfizer series) 07/06/2021    Flu vaccine (1) Never done        RTC:  Return in about 3 months (around 5/9/2023), or 3-6 months. EMR Dragon/transcription disclaimer:  Much of this encounter note is electronic transcription/translation of spoken language to printed texts. The electronic translation of spoken language may be erroneous, or at times, nonsensical words or phrases may be inadvertently transcribed.   Although I have reviewed the note for such errors, some may still exist.

## 2023-02-09 NOTE — ASSESSMENT & PLAN NOTE
Unclear control, unclear etiology   - Multiple renal cysts were seen on CT, low suspicion for polycystic kidney disease. Consider repeat urinalysis and CMP at follow up. If asymptomatic hematuria or increased cr consider further work up for PCKD. Genetic testing is not done routinely for screening. Ultrasound is the initial imaging modality but MRI may be considered if body habitus limits US.

## 2023-02-10 ENCOUNTER — TELEPHONE (OUTPATIENT)
Dept: PRIMARY CARE CLINIC | Age: 38
End: 2023-02-10

## 2023-02-10 DIAGNOSIS — Q61.3 POLYCYSTIC KIDNEY DISEASE: Primary | ICD-10-CM

## 2023-02-10 NOTE — TELEPHONE ENCOUNTER
Please call and let patient know-     With a family history and the cysts seen on imaging patient likely has polycystic kidney disease. Genetic testing is not generally done to confirm diagnosis. Polycystic kidney disease increases risk of forming kidney stones but no additional preventative treatment needed at this time. Bring in previously collected stone. With this knowledge it will be important to monitor kidney function and avoid any medications that can cause stress on the kidney. Do not take NSAIDs. Ex. Ibuprofen, advil    It will be important to keep blood pressure at goal as HTN can also damage the kidneys. No change in current medications. Can continue ozempic.

## 2023-02-10 NOTE — TELEPHONE ENCOUNTER
Pt saw Dr. Cleaning yesterday and when asked a question about his kidneys he told her no. Pt is calling because he found out after his appt that Polycysitic Kidney disease runs in his family.     Pt wanted to update

## 2023-02-12 ENCOUNTER — TELEPHONE (OUTPATIENT)
Dept: PRIMARY CARE CLINIC | Age: 38
End: 2023-02-12

## 2023-02-12 ENCOUNTER — HOSPITAL ENCOUNTER (EMERGENCY)
Age: 38
Discharge: HOME | End: 2023-02-12
Payer: COMMERCIAL

## 2023-02-12 VITALS
WEIGHT: 315 LBS | RESPIRATION RATE: 25 BRPM | DIASTOLIC BLOOD PRESSURE: 95 MMHG | HEART RATE: 99 BPM | BODY MASS INDEX: 45.1 KG/M2 | SYSTOLIC BLOOD PRESSURE: 160 MMHG | TEMPERATURE: 97.6 F | HEIGHT: 70 IN | OXYGEN SATURATION: 96 %

## 2023-02-12 DIAGNOSIS — R11.0 NAUSEA: ICD-10-CM

## 2023-02-12 DIAGNOSIS — R03.0 ELEVATED BLOOD PRESSURE READING: ICD-10-CM

## 2023-02-12 DIAGNOSIS — R73.9 ELEVATED BLOOD SUGAR: Primary | ICD-10-CM

## 2023-02-12 LAB
A/G RATIO: 1.4 (ref 1.1–2.2)
ALBUMIN SERPL-MCNC: 4.8 G/DL (ref 3.4–5)
ALP BLD-CCNC: 142 U/L (ref 40–129)
ALT SERPL-CCNC: 36 U/L (ref 10–40)
ANION GAP SERPL CALCULATED.3IONS-SCNC: 16 MMOL/L (ref 3–16)
AST SERPL-CCNC: 25 U/L (ref 15–37)
BASE EXCESS VENOUS: -3.5 MMOL/L (ref -3–3)
BASOPHILS ABSOLUTE: 0.1 K/UL (ref 0–0.2)
BASOPHILS RELATIVE PERCENT: 0.8 %
BETA-HYDROXYBUTYRATE: 3.4 MMOL/L (ref 0–0.27)
BILIRUB SERPL-MCNC: 1.5 MG/DL (ref 0–1)
BILIRUBIN URINE: ABNORMAL
BLOOD, URINE: ABNORMAL
BUN BLDV-MCNC: 12 MG/DL (ref 7–20)
CALCIUM SERPL-MCNC: 9.6 MG/DL (ref 8.3–10.6)
CARBOXYHEMOGLOBIN: 3.4 % (ref 0–1.5)
CHLORIDE BLD-SCNC: 87 MMOL/L (ref 99–110)
CHP ED QC CHECK: NORMAL
CLARITY: CLEAR
CO2: 25 MMOL/L (ref 21–32)
COLOR: YELLOW
CREAT SERPL-MCNC: 1 MG/DL (ref 0.9–1.3)
CRYSTALS, UA: ABNORMAL /HPF
EOSINOPHILS ABSOLUTE: 0.1 K/UL (ref 0–0.6)
EOSINOPHILS RELATIVE PERCENT: 1.2 %
EPITHELIAL CELLS, UA: ABNORMAL /HPF (ref 0–5)
GFR SERPL CREATININE-BSD FRML MDRD: >60 ML/MIN/{1.73_M2}
GLUCOSE BLD-MCNC: 293 MG/DL
GLUCOSE BLD-MCNC: 293 MG/DL (ref 70–99)
GLUCOSE BLD-MCNC: 303 MG/DL (ref 70–99)
GLUCOSE BLD-MCNC: 369 MG/DL (ref 70–99)
GLUCOSE BLD-MCNC: 395 MG/DL (ref 70–99)
GLUCOSE URINE: >=1000 MG/DL
HCO3 VENOUS: 22.7 MMOL/L (ref 23–29)
HCT VFR BLD CALC: 47.3 % (ref 40.5–52.5)
HEMOGLOBIN: 16.8 G/DL (ref 13.5–17.5)
KETONES, URINE: 40 MG/DL
LACTIC ACID, SEPSIS: 1.5 MMOL/L (ref 0.4–1.9)
LEUKOCYTE ESTERASE, URINE: NEGATIVE
LYMPHOCYTES ABSOLUTE: 1.9 K/UL (ref 1–5.1)
LYMPHOCYTES RELATIVE PERCENT: 18.5 %
MCH RBC QN AUTO: 30.4 PG (ref 26–34)
MCHC RBC AUTO-ENTMCNC: 35.5 G/DL (ref 31–36)
MCV RBC AUTO: 85.6 FL (ref 80–100)
METHEMOGLOBIN VENOUS: 0.3 %
MICROSCOPIC EXAMINATION: YES
MONOCYTES ABSOLUTE: 0.5 K/UL (ref 0–1.3)
MONOCYTES RELATIVE PERCENT: 5 %
NEUTROPHILS ABSOLUTE: 7.6 K/UL (ref 1.7–7.7)
NEUTROPHILS RELATIVE PERCENT: 74.5 %
NITRITE, URINE: NEGATIVE
O2 CONTENT, VEN: 10 VOL %
O2 SAT, VEN: 35 %
O2 THERAPY: ABNORMAL
PCO2, VEN: 44.7 MMHG (ref 40–50)
PDW BLD-RTO: 13.3 % (ref 12.4–15.4)
PERFORMED ON: ABNORMAL
PH UA: 5.5 (ref 5–8)
PH VENOUS: 7.32 (ref 7.35–7.45)
PLATELET # BLD: 285 K/UL (ref 135–450)
PMV BLD AUTO: 9.1 FL (ref 5–10.5)
PO2, VEN: 22.7 MMHG (ref 25–40)
POTASSIUM REFLEX MAGNESIUM: 3.7 MMOL/L (ref 3.5–5.1)
PROTEIN UA: 30 MG/DL
RBC # BLD: 5.53 M/UL (ref 4.2–5.9)
RBC UA: ABNORMAL /HPF (ref 0–4)
SODIUM BLD-SCNC: 128 MMOL/L (ref 136–145)
SPECIFIC GRAVITY UA: 1.02 (ref 1–1.03)
TCO2 CALC VENOUS: 24 MMOL/L
TOTAL PROTEIN: 8.2 G/DL (ref 6.4–8.2)
URINE REFLEX TO CULTURE: ABNORMAL
URINE TYPE: ABNORMAL
UROBILINOGEN, URINE: 0.2 E.U./DL
WBC # BLD: 10.2 K/UL (ref 4–11)
WBC UA: ABNORMAL /HPF (ref 0–5)

## 2023-02-12 PROCEDURE — 80053 COMPREHEN METABOLIC PANEL: CPT

## 2023-02-12 PROCEDURE — 99284 EMERGENCY DEPT VISIT MOD MDM: CPT

## 2023-02-12 PROCEDURE — 85025 COMPLETE CBC W/AUTO DIFF WBC: CPT

## 2023-02-12 PROCEDURE — 36415 COLL VENOUS BLD VENIPUNCTURE: CPT

## 2023-02-12 PROCEDURE — 83605 ASSAY OF LACTIC ACID: CPT

## 2023-02-12 PROCEDURE — 81001 URINALYSIS AUTO W/SCOPE: CPT

## 2023-02-12 PROCEDURE — 6370000000 HC RX 637 (ALT 250 FOR IP): Performed by: NURSE PRACTITIONER

## 2023-02-12 PROCEDURE — 2580000003 HC RX 258: Performed by: STUDENT IN AN ORGANIZED HEALTH CARE EDUCATION/TRAINING PROGRAM

## 2023-02-12 PROCEDURE — 82803 BLOOD GASES ANY COMBINATION: CPT

## 2023-02-12 PROCEDURE — 2580000003 HC RX 258: Performed by: NURSE PRACTITIONER

## 2023-02-12 PROCEDURE — 82010 KETONE BODYS QUAN: CPT

## 2023-02-12 RX ORDER — ONDANSETRON 4 MG/1
4 TABLET, FILM COATED ORAL EVERY 8 HOURS PRN
Qty: 20 TABLET | Refills: 0 | Status: SHIPPED | OUTPATIENT
Start: 2023-02-12

## 2023-02-12 RX ORDER — 0.9 % SODIUM CHLORIDE 0.9 %
1000 INTRAVENOUS SOLUTION INTRAVENOUS ONCE
Status: COMPLETED | OUTPATIENT
Start: 2023-02-12 | End: 2023-02-12

## 2023-02-12 RX ADMIN — SODIUM CHLORIDE 1000 ML: 9 INJECTION, SOLUTION INTRAVENOUS at 19:17

## 2023-02-12 RX ADMIN — SODIUM CHLORIDE 1000 ML: 9 INJECTION, SOLUTION INTRAVENOUS at 16:34

## 2023-02-12 RX ADMIN — METFORMIN HYDROCHLORIDE 500 MG: 500 TABLET ORAL at 19:16

## 2023-02-12 ASSESSMENT — ENCOUNTER SYMPTOMS
COLOR CHANGE: 0
FACIAL SWELLING: 0
ABDOMINAL PAIN: 0
RHINORRHEA: 0
SHORTNESS OF BREATH: 0
SORE THROAT: 0

## 2023-02-12 ASSESSMENT — LIFESTYLE VARIABLES
HOW MANY STANDARD DRINKS CONTAINING ALCOHOL DO YOU HAVE ON A TYPICAL DAY: PATIENT DOES NOT DRINK
HOW OFTEN DO YOU HAVE A DRINK CONTAINING ALCOHOL: NEVER

## 2023-02-12 ASSESSMENT — PAIN - FUNCTIONAL ASSESSMENT
PAIN_FUNCTIONAL_ASSESSMENT: NONE - DENIES PAIN
PAIN_FUNCTIONAL_ASSESSMENT: NONE - DENIES PAIN

## 2023-02-12 NOTE — ED PROVIDER NOTES
[address] [facility]  EMERGENCY DEPARTMENT ENCOUNTER      I am the Primary Clinician of Record    Note started: 4:53 PM EST 2/12/23    NICOLA. I have evaluated this patient. My supervising physician was available for consultation. Pt Name: Ramírez Wright  MRN: [id number]  [place] 1985  Dateof evaluation: 2/12/2023  Provider: Ivanna Wu APRN - CNP  PCP: Romulo Sutton DO  ED Attending: No att. providers found      CHIEF COMPLAINT       Chief Complaint   Patient presents with    Hyperglycemia     Was not feeling well, dry mouth, very thirsty, tired, nauseated. Used a family member's glucometer bs in the 500s. Pt denies being diabetic but did start Ozempic yesterday for weight loss. HISTORY OF PRESENTILLNESS   (Location/Symptom, Timing/Onset, Context/Setting, Quality, Duration, Modifying Factors, Severity)  Note limiting factors. Ramírez Wright is a 44 y.o. male for elevated blood sugar. Onset was today. Context includes patient reports he has not felt well for the last few days. He reports that he has been having very dry mouth and very thirsty. He also reports fatigue. Patient uses family members glucometer and found that he had elevated blood sugar. Patient reports that his blood sugar was 515 and 453 at home. Patient reports that he started on Ozempic yesterday for weight loss. Alleviating factors include sitting. Aggravating factors include nothing. Pain is 0/10. Nothing has been used for pain today. Nursing Notes were all reviewed and agreed with or any disagreements were addressed  in the HPI. REVIEW OF SYSTEMS       Review of Systems   Constitutional:  Positive for fatigue. Negative for activity change, appetite change and fever. HENT:  Negative for congestion, facial swelling, rhinorrhea and sore throat. Eyes:  Negative for visual disturbance. Respiratory:  Negative for shortness of breath. Cardiovascular:  Negative for chest pain. Gastrointestinal:  Negative for abdominal pain. Endocrine: Positive for polyphagia. Genitourinary:  Negative for difficulty urinating. Musculoskeletal:  Negative for arthralgias and myalgias. Skin:  Negative for color change and rash. Neurological:  Negative for dizziness and light-headedness. Psychiatric/Behavioral:  Negative for agitation. All other systems reviewed and are negative. Positives and Pertinent negatives as per HPI. Except as noted above in the ROS, all other systems were reviewed and negative. PAST MEDICAL HISTORY     Past Medical History:   Diagnosis Date    Anxiety     Chronic back pain     Heartburn          SURGICAL HISTORY       Past Surgical History:   Procedure Laterality Date    ANKLE SURGERY Left 2019         CURRENT MEDICATIONS       Previous Medications    ALBUTEROL SULFATE HFA (PROVENTIL;VENTOLIN;PROAIR) 108 (90 BASE) MCG/ACT INHALER    INHALE 2 PUFFS UP TO 4 TIMES/DAY IF NEEDED FOR QUICK RELIEF ONLY. MAY TAKE 1-2 PUFFS BEFORE EXERCISE    ESOMEPRAZOLE MAGNESIUM (NEXIUM) 20 MG PACK    Take 20 mg by mouth daily    SEMAGLUTIDE,0.25 OR 0.5MG/DOS, (OZEMPIC, 0.25 OR 0.5 MG/DOSE,) 2 MG/1.5ML SOPN    Inject 0.25 mg into the skin once a week    SERTRALINE (ZOLOFT) 50 MG TABLET    Take 1 tablet by mouth daily         ALLERGIES     Patient has no known allergies.       FAMILY HISTORY       Family History   Problem Relation Age of Onset    Heart Failure Mother     High Blood Pressure Mother     Diabetes Father     Stroke Father     High Blood Pressure Father           SOCIAL HISTORY       Social History     Socioeconomic History    Marital status:      Spouse name: None    Number of children: None    Years of education: None    Highest education level: None   Tobacco Use    Smoking status: Former     Packs/day: 1.00     Years: 13.00     Pack years: 13.00     Types: Cigarettes     Quit date:      Years since quittin.1    Smokeless tobacco: Never   Vaping Use    Vaping Use: Never used   Substance and Sexual Activity    Alcohol use: Yes     Comment: rarely    Drug use: Never    Sexual activity: Not Currently   Social History Narrative    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     Social Determinants of Health     Financial Resource Strain: Low Risk     Difficulty of Paying Living Expenses: Not hard at all   Food Insecurity: No Food Insecurity    Worried About [address] in the Last Year: Never true    [address] in the Last Year: Never true   Transportation Needs: Unknown    Lack of Transportation (Non-Medical): No   Housing Stability: Unknown    Unstable Housing in the Last Year: No         SCREENINGS           CIWA Assessment  BP: (!) 126/106  Heart Rate: 100          PHYSICAL EXAM     ED Triage Vitals [02/12/23 1616]   BP Temp Temp Source Heart Rate Resp SpO2 Height Weight   (!) 159/69 97.6 °F (36.4 °C) Oral 74 16 98 % 5' 10\" (1.778 m) (!) 320 lb (145.2 kg)       Physical Exam  Constitutional:       Appearance: Normal appearance. He is well-developed. He is obese. HENT:      Head: Normocephalic and atraumatic. Cardiovascular:      Rate and Rhythm: Normal rate. Pulmonary:      Effort: Pulmonary effort is normal. No respiratory distress. Abdominal:      General: There is no distension. Palpations: Abdomen is soft. Tenderness: There is no abdominal tenderness. Musculoskeletal:         General: Normal range of motion. Cervical back: Normal range of motion. Skin:     General: Skin is warm and dry. Neurological:      Mental Status: He is alert and oriented to person, place, and time.        DIAGNOSTIC RESULTS   LABS:    Labs Reviewed   COMPREHENSIVE METABOLIC PANEL W/ REFLEX TO MG FOR LOW K - Abnormal; Notable for the following components:       Result Value    Sodium 128 (*)     Chloride 87 (*)     Glucose 395 (*)     Total Bilirubin 1.5 (*)     Alkaline Phosphatase 142 (*)     All other components within normal limits URINALYSIS WITH REFLEX TO CULTURE - Abnormal; Notable for the following components:    Glucose, Ur >=1000 (*)     Bilirubin Urine MODERATE (*)     Ketones, Urine 40 (*)     Blood, Urine TRACE-INTACT (*)     Protein, UA 30 (*)     All other components within normal limits   BLOOD GAS, VENOUS - Abnormal; Notable for the following components:    pH, Nathan 7.323 (*)     pO2, Nathan 22.7 (*)     HCO3, Venous 22.7 (*)     Base Excess, Nathan -3.5 (*)     Carboxyhemoglobin 3.4 (*)     All other components within normal limits   BETA-HYDROXYBUTYRATE - Abnormal; Notable for the following components:    Beta-Hydroxybutyrate 3.40 (*)     All other components within normal limits   MICROSCOPIC URINALYSIS - Abnormal; Notable for the following components:    Crystals, UA Few Ca. Oxalate (*)     All other components within normal limits   POCT GLUCOSE - Abnormal; Notable for the following components:    POC Glucose 369 (*)     All other components within normal limits   POCT GLUCOSE - Abnormal; Notable for the following components:    POC Glucose 303 (*)     All other components within normal limits   CBC WITH AUTO DIFFERENTIAL   LACTATE, SEPSIS   LACTATE, SEPSIS   POCT GLUCOSE         All other labs were within normal range or not returned as of this dictation. EKG: All EKG's are interpreted by the Emergency Department Physician who either signs or Co-signs this chart in the absence of a cardiologist.  Please see their note for interpretation of EKG. RADIOLOGY:   Non plain film images ans such as CT, ultrasound, and MRI are read by the radiologist. Plain radiographic images are visualized and preliminarily interpreted by the ED Provider with the below findings:            Interpretation per the Radiologist below, if available at the time of this note:    No orders to display     No results found. No results found. No results found.       PROCEDURES   Unless otherwise noted below, none     Procedures     CRITICAL CARE TIME Unless otherwise noted below, none        EMERGENCYDEPARTMENT COURSE/MDM:     Vitals:    Vitals:    02/12/23 1616 02/12/23 1734 02/12/23 1804 02/12/23 1834   BP: (!) 159/69 (!) 158/111 (!) 155/106 (!) 126/106   Pulse: 74 98 95 100   Resp: 16 18 19 14   Temp: 97.6 °F (36.4 °C)      TempSrc: Oral      SpO2: 98%  97% 96%   Weight: (!) 320 lb (145.2 kg)      Height: 5' 10\" (1.778 m)            Patient was seen and evaluated by myself. Patient was here for concerns for elevated blood sugar. Patient states that he has not been feeling well for the past few days. He has had fatigue increased thirst and and a dry mouth. Patient states that he used his feeling members glucometer and found that his sugar was in the 500s. Patient denies any history of Beatties. Patient reports that he has had some nausea but denies any fevers vomiting or diarrhea. Patient denies chest pain or shortness of breath. On exam the patient is awake and alert hemodynamically stable nontoxic in appearance. Point-of-care blood sugars 369 here in the ED. Lab values have been reviewed and interpreted. Patient's repeat blood sugar was found to be 303. Patient was given a second liter of IV fluids in the ED. He will be started on metformin in the ED and given a prescription for home use. He was encouraged to follow-up with his primary care doctor in the next 2 days and return to the ED for worsening symptoms. Patient was discharged with Zofran and metformin prescription. Patient was ultimately discharged with all questions answered.       Patient was given the following medications:  Medications   0.9 % sodium chloride bolus (1,000 mLs IntraVENous New Bag 2/12/23 1917)   0.9 % sodium chloride bolus (1,000 mLs IntraVENous New Bag 2/12/23 1634)   metFORMIN (GLUCOPHAGE) tablet 500 mg (500 mg Oral Given 2/12/23 1916)            CONSULTS:  None      Discussion with other professionals - none    Social determinants - none    Records Reviewed - none    History from - patient    Limitations to history- none    Chronic conditions: has a past medical history of Anxiety, Chronic back pain, and Heartburn.        Is this patient to be included in the SEP-1 Core Measure due to severe sepsis or septic shock?   No   Exclusion criteria - the patient is NOT to be included for SEP-1 Core Measure due to:  Infection is not suspected         The patient tolerated their visit well. I have evaluated this patient. My supervising physician was available for consultation. The patient and / or the family were informed of the results of any tests, a time was given to answer questions, a plan was proposed and they agreed with plan.        FINAL IMPRESSION      1. Elevated blood sugar    2. Nausea    3. Elevated blood pressure reading          DISPOSITION/PLAN   DISPOSITION        PATIENT REFERRED TO:  Renea Curtis DO  [address]  [address]  [facility] [address]  [phone]    Schedule an appointment as soon as possible for a visit in 2 days  for re-evaluation    [facility] ED  [address] [place] [address]  [phone]    If symptoms worsen    DISCHARGE MEDICATIONS:  New Prescriptions    METFORMIN (GLUCOPHAGE) 500 MG TABLET    Take 1 tablet by mouth 2 times daily (with meals)    ONDANSETRON (ZOFRAN) 4 MG TABLET    Take 1 tablet by mouth every 8 hours as needed for Nausea       DISCONTINUED MEDICATIONS:  Discontinued Medications    TAMSULOSIN (FLOMAX) 0.4 MG CAPSULE    Take 1 capsule by mouth daily              (Please note that portions of this note were completed with a voice recognition program.  Efforts were made to edit the dictations but occasionally words are mis-transcribed.)    Patient was seen during the time of the COVID pandemic.  N95 and appropriate PPE was worn during the visit.      ADOLPH Lazar CNP (electronically signed)         ADOLPH Lazar CNP  02/12/23 1931

## 2023-02-13 ASSESSMENT — ENCOUNTER SYMPTOMS
WHEEZING: 0
BACK PAIN: 0
CONSTIPATION: 0
COUGH: 0
EYES NEGATIVE: 1
VOMITING: 0
DIARRHEA: 0
NAUSEA: 0
CHEST TIGHTNESS: 0
SHORTNESS OF BREATH: 0
ABDOMINAL PAIN: 0

## 2023-02-13 NOTE — TELEPHONE ENCOUNTER
I spoke to pt and informed him of the message. Pt said he was in the ER yesterday but not admitted for High Blood sugar. Pt wanted to be seen . I scheduled him an appointment for tomorrow with Dr. Nuzhat Garibay @ 4:15pm     FYI to Dr Nuzhat Garibay. Pt stated that was prescribed metformin but didn't fill it yet. He said until he had covid he was in perfect health. His diet has changed a lot drinking sugary drinks. He wanted a second opinion before he starts the medication.

## 2023-02-13 NOTE — ED NOTES
.Reviewed discharge instructions with patient. Patient verbalized understanding. No distress noted. Ambulatory from department.      Sandie Duncan RN  02/12/23 203

## 2023-02-14 ENCOUNTER — OFFICE VISIT (OUTPATIENT)
Dept: PRIMARY CARE CLINIC | Age: 38
End: 2023-02-14
Payer: COMMERCIAL

## 2023-02-14 VITALS
RESPIRATION RATE: 18 BRPM | HEART RATE: 107 BPM | DIASTOLIC BLOOD PRESSURE: 82 MMHG | OXYGEN SATURATION: 99 % | TEMPERATURE: 96.8 F | WEIGHT: 315 LBS | BODY MASS INDEX: 45.1 KG/M2 | SYSTOLIC BLOOD PRESSURE: 138 MMHG | HEIGHT: 70 IN

## 2023-02-14 DIAGNOSIS — Q61.3 POLYCYSTIC KIDNEY DISEASE: Primary | ICD-10-CM

## 2023-02-14 DIAGNOSIS — R68.2 DRY MOUTH: ICD-10-CM

## 2023-02-14 DIAGNOSIS — Z87.442 HISTORY OF RENAL STONE: ICD-10-CM

## 2023-02-14 DIAGNOSIS — E66.01 CLASS 3 SEVERE OBESITY DUE TO EXCESS CALORIES WITHOUT SERIOUS COMORBIDITY WITH BODY MASS INDEX (BMI) OF 45.0 TO 49.9 IN ADULT (HCC): ICD-10-CM

## 2023-02-14 DIAGNOSIS — R73.9 ELEVATED SERUM GLUCOSE: ICD-10-CM

## 2023-02-14 PROCEDURE — 99214 OFFICE O/P EST MOD 30 MIN: CPT | Performed by: STUDENT IN AN ORGANIZED HEALTH CARE EDUCATION/TRAINING PROGRAM

## 2023-02-14 ASSESSMENT — ANXIETY QUESTIONNAIRES
7. FEELING AFRAID AS IF SOMETHING AWFUL MIGHT HAPPEN: 0
1. FEELING NERVOUS, ANXIOUS, OR ON EDGE: 0
IF YOU CHECKED OFF ANY PROBLEMS ON THIS QUESTIONNAIRE, HOW DIFFICULT HAVE THESE PROBLEMS MADE IT FOR YOU TO DO YOUR WORK, TAKE CARE OF THINGS AT HOME, OR GET ALONG WITH OTHER PEOPLE: NOT DIFFICULT AT ALL
GAD7 TOTAL SCORE: 0
2. NOT BEING ABLE TO STOP OR CONTROL WORRYING: 0
5. BEING SO RESTLESS THAT IT IS HARD TO SIT STILL: 0
4. TROUBLE RELAXING: 0
3. WORRYING TOO MUCH ABOUT DIFFERENT THINGS: 0
6. BECOMING EASILY ANNOYED OR IRRITABLE: 0

## 2023-02-14 ASSESSMENT — PATIENT HEALTH QUESTIONNAIRE - PHQ9
6. FEELING BAD ABOUT YOURSELF - OR THAT YOU ARE A FAILURE OR HAVE LET YOURSELF OR YOUR FAMILY DOWN: 0
7. TROUBLE CONCENTRATING ON THINGS, SUCH AS READING THE NEWSPAPER OR WATCHING TELEVISION: 0
1. LITTLE INTEREST OR PLEASURE IN DOING THINGS: 0
8. MOVING OR SPEAKING SO SLOWLY THAT OTHER PEOPLE COULD HAVE NOTICED. OR THE OPPOSITE, BEING SO FIGETY OR RESTLESS THAT YOU HAVE BEEN MOVING AROUND A LOT MORE THAN USUAL: 0
SUM OF ALL RESPONSES TO PHQ QUESTIONS 1-9: 3
SUM OF ALL RESPONSES TO PHQ QUESTIONS 1-9: 3
4. FEELING TIRED OR HAVING LITTLE ENERGY: 1
2. FEELING DOWN, DEPRESSED OR HOPELESS: 0
10. IF YOU CHECKED OFF ANY PROBLEMS, HOW DIFFICULT HAVE THESE PROBLEMS MADE IT FOR YOU TO DO YOUR WORK, TAKE CARE OF THINGS AT HOME, OR GET ALONG WITH OTHER PEOPLE: 0
SUM OF ALL RESPONSES TO PHQ QUESTIONS 1-9: 3
SUM OF ALL RESPONSES TO PHQ QUESTIONS 1-9: 3
3. TROUBLE FALLING OR STAYING ASLEEP: 1
5. POOR APPETITE OR OVEREATING: 1
SUM OF ALL RESPONSES TO PHQ9 QUESTIONS 1 & 2: 0
9. THOUGHTS THAT YOU WOULD BE BETTER OFF DEAD, OR OF HURTING YOURSELF: 0

## 2023-02-14 NOTE — PROGRESS NOTES
[address] and [facility]idency [facility]                                             [address] [facility], [address] [place] [address], [address] [place] [address]        Phone: [phone]      Name:  Vladimir Javier  :    1985    Consultants:   Patient Care Team:  Jena Owen DO as PCP - General (Family Medicine)    Chief Complaint:     Vladimir Javier is a 44 y.o. male  who presents today for an established patient care visit with Personalized Prevention Plan Services as noted below. HPI:     Patient is a 26-year-old male past medical history of anxiety, obesity, GERD, low back pain, history of renal stone, dry mouth. Patient presents in office today after recent ED visit on 2023 for elevated blood glucose levels. Today, he denies any chest pain, shortness of breath, nausea, vomiting, diarrhea. Nephrolithiasis/ADPKD   Patient was previously evaluated in our office on 2022 for an ED follow-up visit after having nephrolithiasis. Patient at that time had a CT abdomen pelvis with contrast on 2022 which showed a 5 mm renal stone located in the left UVJ with mild left hydronephrosis. He was prescribed ibuprofen and Percocet for pain on discharge from the ED at that time. After leaving our office at that visit, patient increase fluid intake and was started on tamsulosin 0.4 mg daily. He was able to pass the stone independently but was unable to collected for analysis. He has not had a renal stone since then. Additionally, his CT on 2022 noted \"innumerable renal cysts bilaterally. \"  After recent follow-up on our office on 2023, patient did note that he had family history with first-degree relative involvement of renal cysts. He was told that polycystic kidney disease can increase the risk of kidney stone formation and was told to improve hypertension control and avoid NSAID usage.  Today, he does endorse that mother has history of polycystic kidneys and was told that this disease does not skip a generation in his family. He continues to avoid NSAID usage and has not had recurrent kidney stones since his last episode. Obesity/Elevated Blood Glucose/Dry Mouth   Patient was started on Ozempic on 2/10/2023 for weight loss given BMI of almost 46. Patient called the on-call number on 2/12/2023 and reported that his fasting blood sugar was 453 and had new onset of decreased appetite. He was instructed to go to the ER for evaluation. He presented to the ED on 2/12/2023 with hyperglycemia, polydipsia, dry mouth, fatigue. He used an at-home glucose monitor that belonged to a family member and reports that initial blood sugar was 453 and repeat was 515. On admission, his spot glucose was 395. Patient was given 2 L of IV fluids with downtrending POCT glucose levels from 369 to 303. He was discharged on metformin 500 mg twice daily and Zofran 4 mg every 8 hours as needed. He called our office on 2/13/2023 to schedule follow-up appointment to be reevaluated for his hyperglycemia. He has not yet filled his metformin prescription and says that until he recently contracted COVID-19 he was in perfect health. His CT of the abdomen pelvis from 9/5/2022 did note diffuse fatty liver infiltration. Today, patient is seeking additional advice before starting metformin today. He does endorse drinking many beverages that are high in sugar content. He has started to make dietary changes including adherence to the [employer]. Patient states that he has a stationary bike and a real bike which she plans on using to gain more activity. He does endorse dry mouth today but states that for the last few days it was significantly improved since his discharge from the ED. He continues to monitor his fasting glucose at home and states that his fasting glucose usually is around 300.   His father gave him glucose monitoring kit with test strips and supplies. Patient Active Problem List   Diagnosis    Anxiety    History of renal stone    Class 3 severe obesity due to excess calories without serious comorbidity with body mass index (BMI) of 45.0 to 49.9 in adult [facility])    Dry mouth    Polycystic kidney disease         Past Medical History:    Past Medical History:   Diagnosis Date    Anxiety     Chronic back pain     Heartburn        Past Surgical History:  Past Surgical History:   Procedure Laterality Date    ANKLE SURGERY Left 12/26/2019       Home Meds:  Prior to Visit Medications    Medication Sig Taking? Authorizing Provider   metFORMIN (GLUCOPHAGE) 500 MG tablet Take 1 tablet by mouth 2 times daily (with meals) Yes ADOLPH Wooten CNP   ondansetron (ZOFRAN) 4 MG tablet Take 1 tablet by mouth every 8 hours as needed for Nausea Yes ADOLPH Wooten CNP   albuterol sulfate HFA (PROVENTIL;VENTOLIN;PROAIR) 108 (90 Base) MCG/ACT inhaler INHALE 2 PUFFS UP TO 4 TIMES/DAY IF NEEDED FOR QUICK RELIEF ONLY. MAY TAKE 1-2 PUFFS BEFORE EXERCISE Yes Historical Provider, MD   sertraline (ZOLOFT) 50 MG tablet Take 1 tablet by mouth daily Yes Cheryle Paci,    esomeprazole Magnesium (NEXIUM) 20 MG PACK Take 20 mg by mouth daily Yes Historical Provider, MD       Allergies:    Patient has no known allergies.     Family History:       Problem Relation Age of Onset    Heart Failure Mother     High Blood Pressure Mother     Kidney Disease Mother     Diabetes Father     Stroke Father     High Blood Pressure Father          Health Maintenance Completed:  Health Maintenance   Topic Date Due    COVID-19 Vaccine (3 - Booster for Pfizer series) 07/06/2021    Flu vaccine (1) Never done    Depression Screen  02/14/2024    Diabetes screen  09/08/2025    DTaP/Tdap/Td vaccine (7 - Td or Tdap) 12/31/2027    Hib vaccine  Completed    Hepatitis A vaccine  Aged Out    Meningococcal (ACWY) vaccine  Aged Out    Pneumococcal 0-64 years Vaccine  Aged Out    Varicella vaccine  Discontinued    Hepatitis C screen  Discontinued    HIV screen  Discontinued          Immunization History   Administered Date(s) Administered    COVID-19, PFIZER PURPLE top, DILUTE for use, (age 15 y+), 30mcg/0.3mL 04/20/2021, 05/11/2021    DTP 1984, 1984, 1984, 10/21/1985    Hepatitis B Ped/Adol (Engerix-B, Recombivax HB) 08/17/1996, 10/07/1999, 02/03/2000    Hib vaccine 11/13/1986    MMR 10/21/1985, 08/17/1996    Polio OPV 1984, 1984, 10/21/1985, 08/17/1996    Td vaccine (adult) 08/17/1996    Tdap (Boostrix, Adacel) 12/31/2017         Review of Systems:  Review of Systems   Constitutional:  Negative for chills, fatigue and fever. HENT: Negative. Eyes: Negative. Respiratory:  Negative for cough, chest tightness, shortness of breath and wheezing. Cardiovascular:  Negative for chest pain and palpitations. Gastrointestinal:  Negative for abdominal pain, constipation, diarrhea, nausea and vomiting. Endocrine: Positive for polydipsia. Negative for polyuria. Genitourinary:  Negative for decreased urine volume, dysuria, flank pain, frequency, hematuria and urgency. Musculoskeletal:  Negative for arthralgias, back pain and myalgias. Neurological:  Negative for dizziness, tremors, syncope, weakness, light-headedness, numbness and headaches. Psychiatric/Behavioral:  Negative for sleep disturbance. The patient is not nervous/anxious. Physical Exam:   Vitals:    02/14/23 1612   BP: 138/82   Site: Left Upper Arm   Position: Sitting   Cuff Size: Large Adult   Pulse: (!) 107   Resp: 18   Temp: 96.8 °F (36 °C)   TempSrc: Temporal   SpO2: 99%   Weight: (!) 317 lb 9.6 oz (144.1 kg)   Height: 5' 10\" (1.778 m)     Body mass index is 45.57 kg/m².     Wt Readings from Last 3 Encounters:   02/14/23 (!) 317 lb 9.6 oz (144.1 kg)   02/12/23 (!) 320 lb (145.2 kg)   02/09/23 (!) 320 lb 12.8 oz (145.5 kg)       BP Readings from Last 3 Encounters:   02/14/23 138/82 02/12/23 (!) 160/95   02/09/23 136/82       Physical Exam  Constitutional:       General: He is not in acute distress. Appearance: Normal appearance. He is obese. He is not ill-appearing or toxic-appearing. HENT:      Head: Normocephalic and atraumatic. Eyes:      General: No scleral icterus. Right eye: No discharge. Left eye: No discharge. Extraocular Movements: Extraocular movements intact. Conjunctiva/sclera: Conjunctivae normal.      Pupils: Pupils are equal, round, and reactive to light. Cardiovascular:      Rate and Rhythm: Normal rate and regular rhythm. Pulses: Normal pulses. Heart sounds: Normal heart sounds. No murmur heard. No friction rub. No gallop. Pulmonary:      Effort: Pulmonary effort is normal. No respiratory distress. Breath sounds: Normal breath sounds. No stridor. No wheezing, rhonchi or rales. Abdominal:      General: Abdomen is flat. There is no distension. Palpations: Abdomen is soft. There is no mass. Tenderness: There is no abdominal tenderness. There is no right CVA tenderness, left CVA tenderness, guarding or rebound. Hernia: No hernia is present. Musculoskeletal:         General: No deformity or signs of injury. Cervical back: Neck supple. Right lower leg: No edema. Left lower leg: No edema. Skin:     General: Skin is warm and dry. Capillary Refill: Capillary refill takes less than 2 seconds. Neurological:      General: No focal deficit present. Mental Status: He is alert and oriented to person, place, and time. Mental status is at baseline.    Psychiatric:         Mood and Affect: Mood normal.         Behavior: Behavior normal.            Lab Review:   Admission on 02/12/2023, Discharged on 02/12/2023   Component Date Value    POC Glucose 02/12/2023 369 (A)     Performed on 02/12/2023 ACCU-CHEK     WBC 02/12/2023 10.2     RBC 02/12/2023 5.53     Hemoglobin 02/12/2023 16.8 Hematocrit 02/12/2023 47.3     MCV 02/12/2023 85.6     MCH 02/12/2023 30.4     MCHC 02/12/2023 35.5     RDW 02/12/2023 13.3     Platelets 15/98/3283 285     MPV 02/12/2023 9.1     Neutrophils % 02/12/2023 74.5     Lymphocytes % 02/12/2023 18.5     Monocytes % 02/12/2023 5.0     Eosinophils % 02/12/2023 1.2     Basophils % 02/12/2023 0.8     Neutrophils Absolute 02/12/2023 7.6     Lymphocytes Absolute 02/12/2023 1.9     Monocytes Absolute 02/12/2023 0.5     Eosinophils Absolute 02/12/2023 0.1     Basophils Absolute 02/12/2023 0.1     Sodium 02/12/2023 128 (A)     Potassium reflex Magnesi* 02/12/2023 3.7     Chloride 02/12/2023 87 (A)     CO2 02/12/2023 25     Anion Gap 02/12/2023 16     Glucose 02/12/2023 395 (A)     BUN 02/12/2023 12     Creatinine 02/12/2023 1.0     Est, Glom Filt Rate 02/12/2023 >60     Calcium 02/12/2023 9.6     Total Protein 02/12/2023 8.2     Albumin 02/12/2023 4.8     Albumin/Globulin Ratio 02/12/2023 1.4     Total Bilirubin 02/12/2023 1.5 (A)     Alkaline Phosphatase 02/12/2023 142 (A)     ALT 02/12/2023 36     AST 02/12/2023 25     Color, UA 02/12/2023 Yellow     Clarity, UA 02/12/2023 Clear     Glucose, Ur 02/12/2023 >=1000 (A)     Bilirubin Urine 02/12/2023 MODERATE (A)     Ketones, Urine 02/12/2023 40 (A)     Specific [place], UA 02/12/2023 1.025     Blood, Urine 02/12/2023 TRACE-INTACT (A)     pH, UA 02/12/2023 5.5     Protein, UA 02/12/2023 30 (A)     Urobilinogen, Urine 02/12/2023 0.2     Nitrite, Urine 02/12/2023 Negative     Leukocyte Esterase, Urine 02/12/2023 Negative     Microscopic Examination 02/12/2023 YES     Urine Type 02/12/2023 NotGiven     Urine Reflex to Culture 02/12/2023 Not Indicated     pH, Nathan 02/12/2023 7.323 (A)     pCO2, Nathan 02/12/2023 44.7     pO2, Nathan 02/12/2023 22.7 (A)     HCO3, Venous 02/12/2023 22.7 (A)     Base Excess, Nathan 02/12/2023 -3.5 (A)     O2 Sat, Nathan 02/12/2023 35     Carboxyhemoglobin 02/12/2023 3.4 (A)     MetHgb, Nathan 02/12/2023 0.3     TC02 (Calc), Nathan 02/12/2023 24     O2 Content, Nathan 02/12/2023 10     O2 Therapy 02/12/2023 Unknown     Beta-Hydroxybutyrate 02/12/2023 3.40 (A)     Lactic Acid, Sepsis 02/12/2023 1.5     WBC, UA 02/12/2023 None seen     RBC, UA 02/12/2023 0-2     Epithelial Cells, UA 02/12/2023 0-1     Crystals, UA 02/12/2023 Few Ca. Oxalate (A)     Glucose 02/12/2023 293     QC OK? 02/12/2023 ok     POC Glucose 02/12/2023 303 (A)     Performed on 02/12/2023 ACCU-CHEK     POC Glucose 02/12/2023 293 (A)     Performed on 02/12/2023 ACCU-CHEK           Assessment/Plan:  Suarez Guardian was seen today for follow-up from hospital.    Diagnoses and all orders for this visit:    Polycystic kidney disease    History of renal stone    Class 3 severe obesity due to excess calories without serious comorbidity with body mass index (BMI) of 45.0 to 49.9 in adult [facility])  -     LIPID PANEL; Future  -     Comprehensive Metabolic Panel; Future    Dry mouth  -     Hemoglobin A1C; Future  -     Comprehensive Metabolic Panel; Future    Elevated serum glucose  -     Hemoglobin A1C; Future  -     MICROALBUMIN / CREATININE URINE RATIO; Future  -     Comprehensive Metabolic Panel; Future          Patient is a 79-year-old male past medical history of anxiety, obesity, GERD, low back pain, history of renal stone, dry mouth. Patient presents in office today after recent ED visit on 2/12/2023 for elevated blood glucose levels. Nephrolithiasis/ADPKD   Not controlled, not at goal.  No recurrent episodes of kidney stones since being discharged from the ED. Recommend patient continue to avoid NSAID usage and use Tylenol for pain medication. Continue adequate fluid hydration with water and the prevention of recurrent kidney stones. RTC in 3 months to reevaluate or sooner if symptoms worsen. Obesity/Elevated Blood Glucose/Dry Mouth   Not controlled, not at goal.   I did encourage patient to start adhering to the DASH diet.   He already has a pamphlet which outlines appropriate foods. I encourage patient to discontinue and/or avoid beverages high in sugar content. I discussed 150 minutes/week of moderate intensity activity with the patient. We discussed a tentative plan that includes biking. He currently works from home for 2 jobs and has more flexibility in making lifestyle changes. I did encourage patient to start using metformin 500 mg twice daily. I advised him on the risk and benefits including side effects of this medication. Patient is agreeable and understands. Consider adding SGLT2 inhibitor at next visit pending A1c. Patient exhibited side effects to GLP-1 analog. Will order hemoglobin A1c, lipid panel CMP, urine micro today. We will notify patient of results after they return and consider starting additional medication. I encourage patient to continue checking fasting glucose levels with his own glucose monitoring kit. He should record results and bring them to his next office visit. RTC in 1 to 2 weeks to follow-up on results and medication management. Health Maintenance Due:  Health Maintenance Due   Topic Date Due    COVID-19 Vaccine (3 - Booster for Pfizer series) 07/06/2021    Flu vaccine (1) Never done          Health care decision maker:  <72years old      Health Maintenance: (USPSTF Recommendations)  (M) Prostate Cancer Screen: (54-79 yo discuss benefits/harm, does not recommend testing PSA in men >75 yo (D):   (M) AAA Screen: (men 73-69 yo who has ever smoked (B), consider in nonsmokers if high risk):  CRC/Colonoscopy Screening: (adults 39-53 (B), 50-75 (A))  Lung Ca Screening: Annual LDCT (+smoker age 49-80, smoked within 15 years, total of 20 pack yr history (B)):  DEXA Screen: (women >65 and older, <65 if at risk/postmenopausal (B))  HIV Screen: (16-65 yr old, and all pregnant patients (A)):   Hep C Screen: (18-79 yr old (B)):  HCC Screen: (all pts with cirrhosis and high risk Hep B (US q6 mo)):  Immunizations:    RTC:  Return in about 2 weeks (around 2/28/2023). EMR Dragon/transcription disclaimer:  Much of this encounter note is electronic transcription/translation of spoken language to printed texts. The electronic translation of spoken language may be erroneous, or at times, nonsensical words or phrases may be inadvertently transcribed.   Although I have reviewed the note for such errors, some may still exist.

## 2023-02-15 ENCOUNTER — HOSPITAL ENCOUNTER (OUTPATIENT)
Age: 38
Discharge: HOME | End: 2023-02-15
Payer: COMMERCIAL

## 2023-02-15 DIAGNOSIS — R73.9 ELEVATED SERUM GLUCOSE: ICD-10-CM

## 2023-02-15 DIAGNOSIS — R68.2 DRY MOUTH: ICD-10-CM

## 2023-02-15 DIAGNOSIS — E66.01 CLASS 3 SEVERE OBESITY DUE TO EXCESS CALORIES WITHOUT SERIOUS COMORBIDITY WITH BODY MASS INDEX (BMI) OF 45.0 TO 49.9 IN ADULT (HCC): ICD-10-CM

## 2023-02-15 LAB
A/G RATIO: 1.5 (ref 1.1–2.2)
ALBUMIN SERPL-MCNC: 4.3 G/DL (ref 3.4–5)
ALP BLD-CCNC: 105 U/L (ref 40–129)
ALT SERPL-CCNC: 30 U/L (ref 10–40)
ANION GAP SERPL CALCULATED.3IONS-SCNC: 17 MMOL/L (ref 3–16)
AST SERPL-CCNC: 25 U/L (ref 15–37)
BILIRUB SERPL-MCNC: 0.9 MG/DL (ref 0–1)
BUN BLDV-MCNC: 14 MG/DL (ref 7–20)
CALCIUM SERPL-MCNC: 9.3 MG/DL (ref 8.3–10.6)
CHLORIDE BLD-SCNC: 97 MMOL/L (ref 99–110)
CHOLESTEROL, TOTAL: 168 MG/DL (ref 0–199)
CO2: 19 MMOL/L (ref 21–32)
CREAT SERPL-MCNC: 0.9 MG/DL (ref 0.9–1.3)
CREATININE URINE: 114.5 MG/DL (ref 39–259)
GFR SERPL CREATININE-BSD FRML MDRD: >60 ML/MIN/{1.73_M2}
GLUCOSE BLD-MCNC: 329 MG/DL (ref 70–99)
HDLC SERPL-MCNC: 24 MG/DL (ref 40–60)
LDL CHOLESTEROL CALCULATED: ABNORMAL MG/DL
LDL CHOLESTEROL DIRECT: 106 MG/DL
MICROALBUMIN UR-MCNC: 2.1 MG/DL
MICROALBUMIN/CREAT UR-RTO: 18.3 MG/G (ref 0–30)
POTASSIUM SERPL-SCNC: 3.8 MMOL/L (ref 3.5–5.1)
SODIUM BLD-SCNC: 133 MMOL/L (ref 136–145)
TOTAL PROTEIN: 7.1 G/DL (ref 6.4–8.2)
TRIGL SERPL-MCNC: 309 MG/DL (ref 0–150)
VLDLC SERPL CALC-MCNC: ABNORMAL MG/DL

## 2023-02-15 PROCEDURE — 80061 LIPID PANEL: CPT

## 2023-02-15 PROCEDURE — 83036 HEMOGLOBIN GLYCOSYLATED A1C: CPT

## 2023-02-15 PROCEDURE — 36415 COLL VENOUS BLD VENIPUNCTURE: CPT

## 2023-02-15 PROCEDURE — 80053 COMPREHEN METABOLIC PANEL: CPT

## 2023-02-15 PROCEDURE — 82043 UR ALBUMIN QUANTITATIVE: CPT

## 2023-02-15 PROCEDURE — 82570 ASSAY OF URINE CREATININE: CPT

## 2023-02-15 NOTE — TELEPHONE ENCOUNTER
Please let him know that testosterone should be checked first thing in the morning, as soon after getting up as possible. I worry that a 10 am lab draw would not give us the most accurate information.  I see he has visit scheduled for 2/27, we can talk then about why he is concerned about testosterone levels and then if we need to check, he should try to get draw by 8 am.

## 2023-02-16 DIAGNOSIS — E11.65 TYPE 2 DIABETES MELLITUS WITH HYPERGLYCEMIA, WITHOUT LONG-TERM CURRENT USE OF INSULIN (HCC): Primary | ICD-10-CM

## 2023-02-16 LAB
ESTIMATED AVERAGE GLUCOSE: 214.5 MG/DL
HBA1C MFR BLD: 9.1 %

## 2023-02-16 RX ORDER — ATORVASTATIN CALCIUM 10 MG/1
10 TABLET, FILM COATED ORAL DAILY
Qty: 30 TABLET | Refills: 1 | Status: SHIPPED | OUTPATIENT
Start: 2023-02-16

## 2023-02-16 RX ORDER — LISINOPRIL 5 MG/1
5 TABLET ORAL DAILY
Qty: 30 TABLET | Refills: 1 | Status: SHIPPED | OUTPATIENT
Start: 2023-02-16

## 2023-02-22 NOTE — PROGRESS NOTES
[address] and [facility] Medicine Residency Practice                                             [address] [facility], [address] [place] [address], [address] [place], [place] [id number]        Phone: [phone]      Name:  Javy Grace  :    1985    Consultants:   Patient Care Team:  Nannette Rae DO as PCP - General (Family Medicine)    Chief Complaint:     Javy Grace is a 44 y.o. male  who presents today for an established patient care visit with Personalized Prevention Plan Services as noted below. HPI:     Javy Grace is a 44 y.o. male with hx of  anxiety, GERD, low back pain, ADPKD, nephrolithiasis, T2DM who presents today for 2 wk fu of recent lab work. Pt was recently seen in ED for elevated glucose. At ED fu visit, HgA1c, lipid panel, CMP, and urine microalbumin/creatinine ratio were ordered. A1c was 9.1%, up from 5.3% 5 months prior, and glucose 329. Kidney/liver function was okay - no excessive protein spillage in urine. Mild elevation of LDL at 106 and TG elevated to 309, likely secondary to hyperglycemia. Metformin was increased to 1000 mg BID based on results and he was started on atorvastatin 10 mg and lisinopril 5 mg daily. ***          Patient Active Problem List   Diagnosis    Anxiety    History of renal stone    Class 3 severe obesity due to excess calories without serious comorbidity with body mass index (BMI) of 45.0 to 49.9 in adult [facility])    Dry mouth    Polycystic kidney disease         Past Medical History:    Past Medical History:   Diagnosis Date    Anxiety     Chronic back pain     Heartburn        Past Surgical History:  Past Surgical History:   Procedure Laterality Date    ANKLE SURGERY Left 2019       Home Meds:  Prior to Visit Medications    Medication Sig Taking?  Authorizing Provider   lisinopril (PRINIVIL;ZESTRIL) 5 MG tablet Take 1 tablet by mouth daily  Ernesto Quinteros DO   metFORMIN (GLUCOPHAGE) 1000 MG tablet Take 1 tablet by mouth 2 times daily (with meals)  Neldon Arona, DO   atorvastatin (LIPITOR) 10 MG tablet Take 1 tablet by mouth daily  Neldon Arona, DO   ondansetron (ZOFRAN) 4 MG tablet Take 1 tablet by mouth every 8 hours as needed for Nausea  Diann Nesbitt, APRN - CNP   albuterol sulfate HFA (PROVENTIL;VENTOLIN;PROAIR) 108 (90 Base) MCG/ACT inhaler INHALE 2 PUFFS UP TO 4 TIMES/DAY IF NEEDED FOR QUICK RELIEF ONLY. MAY TAKE 1-2 PUFFS BEFORE EXERCISE  Historical Provider, MD   sertraline (ZOLOFT) 50 MG tablet Take 1 tablet by mouth daily  Dalia Day DO   esomeprazole Magnesium (NEXIUM) 20 MG PACK Take 20 mg by mouth daily  Historical Provider, MD       Allergies:    Patient has no known allergies.     Family History:       Problem Relation Age of Onset    Heart Failure Mother     High Blood Pressure Mother     Kidney Disease Mother     Diabetes Father     Stroke Father     High Blood Pressure Father          Health Maintenance Completed:  Health Maintenance   Topic Date Due    Pneumococcal 0-64 years Vaccine (1 - PCV) Never done    COVID-19 Vaccine (3 - Booster for Pfizer series) 07/06/2021    Flu vaccine (1) Never done    A1C test (Diabetic or Prediabetic)  05/15/2023    Depression Screen  02/14/2024    Lipids  02/15/2024    DTaP/Tdap/Td vaccine (7 - Td or Tdap) 12/31/2027    Hib vaccine  Completed    Hepatitis A vaccine  Aged Out    Meningococcal (ACWY) vaccine  Aged Out    Varicella vaccine  Discontinued    Hepatitis C screen  Discontinued    HIV screen  Discontinued          Immunization History   Administered Date(s) Administered    COVID-19, PFIZER PURPLE top, DILUTE for use, (age 15 y+), 30mcg/0.3mL 04/20/2021, 05/11/2021    DTP 1984, 1984, 1984, 10/21/1985    Hepatitis B Ped/Adol (Engerix-B, Recombivax HB) 08/17/1996, 10/07/1999, 02/03/2000    Hib vaccine 11/13/1986    MMR 10/21/1985, 08/17/1996    Polio OPV 1984, 1984, 10/21/1985, 08/17/1996    Td vaccine (adult) 08/17/1996    Tdap (Boostrix, Adacel) 12/31/2017         Review of Systems:  Review of Systems     Physical Exam:   There were no vitals filed for this visit. There is no height or weight on file to calculate BMI. Wt Readings from Last 3 Encounters:   02/14/23 (!) 317 lb 9.6 oz (144.1 kg)   02/12/23 (!) 320 lb (145.2 kg)   02/09/23 (!) 320 lb 12.8 oz (145.5 kg)       BP Readings from Last 3 Encounters:   02/14/23 138/82   02/12/23 (!) 160/95   02/09/23 136/82       Physical Exam         Lab Review: {Recent LK:43995::\"PJQ applicable\"}       Assessment/Plan:  There are no diagnoses linked to this encounter. Health Maintenance Due:  Health Maintenance Due   Topic Date Due    Pneumococcal 0-64 years Vaccine (1 - PCV) Never done    COVID-19 Vaccine (3 - Booster for Pfizer series) 07/06/2021    Flu vaccine (1) Never done          Health care decision maker:  {health care decision maker allegra:83136}  Vot-ER:  {vo+ER allegra:16902}      Health Maintenance: (USPSTF Recommendations)  (F) Breast Cancer Screen: (40-49 (C), 50-74 biennial screening mammogram (B))  (F) Cervical Cancer Screen: (21-29 q3yr cytology alone; 30-65 q3yr cytology alone, q5yr with hrHPV alone, or q5yr cytology+hrHPV (A))  (M) Prostate Cancer Screen: (54-77 yo discuss benefits/harm, does not recommend testing PSA in men >73 yo (D):   (M) AAA Screen: (men 73-67 yo who has ever smoked (B), consider in nonsmokers if high risk):  CRC/Colonoscopy Screening: (adults 39-53 (B), 50-75 (A))  Lung Ca Screening: Annual LDCT (+smoker age 49-80, smoked within 15 years, total of 20 pack yr history (B)):  DEXA Screen: (women >65 and older, <65 if at risk/postmenopausal (B))  HIV Screen: (16-65 yr old, and all pregnant patients (A)): Hep C Screen: (18-79 yr old (B)):  HCC Screen: (all pts with cirrhosis and high risk Hep B (US q6 mo)):  Immunizations:    RTC:  No follow-ups on file.      EMR Dragon/transcription disclaimer:  Much of this encounter note is electronic transcription/translation of spoken language to printed texts. The electronic translation of spoken language may be erroneous, or at times, nonsensical words or phrases may be inadvertently transcribed.   Although I have reviewed the note for such errors, some may still exist.

## 2023-02-27 ENCOUNTER — OFFICE VISIT (OUTPATIENT)
Dept: PRIMARY CARE CLINIC | Age: 38
End: 2023-02-27
Payer: COMMERCIAL

## 2023-02-27 VITALS
HEIGHT: 70 IN | HEART RATE: 85 BPM | SYSTOLIC BLOOD PRESSURE: 122 MMHG | DIASTOLIC BLOOD PRESSURE: 74 MMHG | WEIGHT: 315 LBS | RESPIRATION RATE: 18 BRPM | BODY MASS INDEX: 45.1 KG/M2 | TEMPERATURE: 97.2 F | OXYGEN SATURATION: 94 %

## 2023-02-27 DIAGNOSIS — E11.9 TYPE 2 DIABETES MELLITUS WITHOUT COMPLICATION, WITHOUT LONG-TERM CURRENT USE OF INSULIN (HCC): Primary | ICD-10-CM

## 2023-02-27 DIAGNOSIS — R53.83 OTHER FATIGUE: ICD-10-CM

## 2023-02-27 DIAGNOSIS — E66.01 CLASS 3 SEVERE OBESITY DUE TO EXCESS CALORIES WITHOUT SERIOUS COMORBIDITY WITH BODY MASS INDEX (BMI) OF 45.0 TO 49.9 IN ADULT (HCC): ICD-10-CM

## 2023-02-27 DIAGNOSIS — R79.89 LOW TESTOSTERONE IN MALE: ICD-10-CM

## 2023-02-27 PROCEDURE — 99214 OFFICE O/P EST MOD 30 MIN: CPT

## 2023-02-27 PROCEDURE — 3046F HEMOGLOBIN A1C LEVEL >9.0%: CPT

## 2023-02-27 RX ORDER — ONDANSETRON 4 MG/1
4 TABLET, FILM COATED ORAL EVERY 8 HOURS PRN
Qty: 20 TABLET | Refills: 0 | Status: SHIPPED | OUTPATIENT
Start: 2023-02-27

## 2023-02-27 RX ORDER — TIRZEPATIDE 2.5 MG/.5ML
2.5 INJECTION, SOLUTION SUBCUTANEOUS WEEKLY
Qty: 4 ADJUSTABLE DOSE PRE-FILLED PEN SYRINGE | Refills: 0 | Status: SHIPPED | OUTPATIENT
Start: 2023-02-27

## 2023-02-27 ASSESSMENT — PATIENT HEALTH QUESTIONNAIRE - PHQ9
4. FEELING TIRED OR HAVING LITTLE ENERGY: 1
8. MOVING OR SPEAKING SO SLOWLY THAT OTHER PEOPLE COULD HAVE NOTICED. OR THE OPPOSITE, BEING SO FIGETY OR RESTLESS THAT YOU HAVE BEEN MOVING AROUND A LOT MORE THAN USUAL: 0
DEPRESSION UNABLE TO ASSESS: FUNCTIONAL CAPACITY MOTIVATION LIMITS ACCURACY
6. FEELING BAD ABOUT YOURSELF - OR THAT YOU ARE A FAILURE OR HAVE LET YOURSELF OR YOUR FAMILY DOWN: 0
5. POOR APPETITE OR OVEREATING: 0
SUM OF ALL RESPONSES TO PHQ QUESTIONS 1-9: 2
2. FEELING DOWN, DEPRESSED OR HOPELESS: 0
SUM OF ALL RESPONSES TO PHQ QUESTIONS 1-9: 2
1. LITTLE INTEREST OR PLEASURE IN DOING THINGS: 0
SUM OF ALL RESPONSES TO PHQ QUESTIONS 1-9: 2
3. TROUBLE FALLING OR STAYING ASLEEP: 1
10. IF YOU CHECKED OFF ANY PROBLEMS, HOW DIFFICULT HAVE THESE PROBLEMS MADE IT FOR YOU TO DO YOUR WORK, TAKE CARE OF THINGS AT HOME, OR GET ALONG WITH OTHER PEOPLE: 0
SUM OF ALL RESPONSES TO PHQ QUESTIONS 1-9: 2
9. THOUGHTS THAT YOU WOULD BE BETTER OFF DEAD, OR OF HURTING YOURSELF: 0
7. TROUBLE CONCENTRATING ON THINGS, SUCH AS READING THE NEWSPAPER OR WATCHING TELEVISION: 0
SUM OF ALL RESPONSES TO PHQ9 QUESTIONS 1 & 2: 0

## 2023-02-27 ASSESSMENT — ANXIETY QUESTIONNAIRES
1. FEELING NERVOUS, ANXIOUS, OR ON EDGE: 0
GAD7 TOTAL SCORE: 0
5. BEING SO RESTLESS THAT IT IS HARD TO SIT STILL: 0
6. BECOMING EASILY ANNOYED OR IRRITABLE: 0
4. TROUBLE RELAXING: 0
3. WORRYING TOO MUCH ABOUT DIFFERENT THINGS: 0
7. FEELING AFRAID AS IF SOMETHING AWFUL MIGHT HAPPEN: 0
2. NOT BEING ABLE TO STOP OR CONTROL WORRYING: 0

## 2023-02-28 DIAGNOSIS — E11.9 TYPE 2 DIABETES MELLITUS WITHOUT COMPLICATION, WITHOUT LONG-TERM CURRENT USE OF INSULIN (HCC): Primary | ICD-10-CM

## 2023-02-28 ASSESSMENT — ENCOUNTER SYMPTOMS
DIARRHEA: 1
SHORTNESS OF BREATH: 0
SINUS PAIN: 0
SORE THROAT: 0
COUGH: 0

## 2023-02-28 NOTE — TELEPHONE ENCOUNTER
PA submitted VIA CMM for  Mounjaro 2.5MG/0.5ML pen-injectors  Key: [id number]  PENDING    Message from Plan  PA Case: [id number], Status: Denied. Notification: Completed.

## 2023-02-28 NOTE — PROGRESS NOTES
[address],  [place], [address] [facility] [address]        Phone: [phone]    The following is written by a medical student, please see below for resident/attending attestation and plan. Name:  Reilly Ayala  :    1985    Consultants:   Patient Care Team:  Arpit Patino DO as PCP - General (Family Medicine)    Chief Complaint:     Reilly Ayala is a 44 y.o. male  who presents today for an established patient care visit with Personalized Prevention Plan Services as noted below. HPI:     Reilly Ayala is a 44year old male with a PMHx relevant for T2DM, ADPKD, and obesity, presenting for a 2 week follow-up for management of diabetes. He reports that he has been having diarrhea and reflux secondary to his 1000 mg (BID) of metformin, but he is not interested in starting a different formulation at this time. He often takes only 500mg of metformin in the evening if he checks his blood sugar. He was counseled on Metformin's role more long term, rather than as an acute fix for high blood sugar. He is concerned that Ozempic and Apple Maikel Corn caused his diabetes, but endorses drinking multiple gallons of brandin-aid per day prior to his diagnosis due to dry mouth and throat. He is still complaining of dry mouth today. He also reports low energy, and low sexual desire for months to years. He states that about 10 years ago he was diagnosed with a testosterone deficiency and required injections. He is interested in getting labs to test testosterone at this time.          Patient Active Problem List   Diagnosis    Anxiety    History of renal stone    Class 3 severe obesity due to excess calories without serious comorbidity with body mass index (BMI) of 45.0 to 49.9 in adult [facility])    Dry mouth    Polycystic kidney disease         Past Medical History:    Past Medical History:   Diagnosis Date    Anxiety     Chronic back pain     Heartburn        Past Surgical History:  Past Surgical History:   Procedure Laterality Date    ANKLE SURGERY Left 12/26/2019       Home Meds:  Prior to Visit Medications    Medication Sig Taking? Authorizing Provider   ondansetron (ZOFRAN) 4 MG tablet Take 1 tablet by mouth every 8 hours as needed for Nausea Yes Kye Ryan, DO   Tirzepatide [facility]) 2.5 MG/0.5ML SOPN SC injection Inject 0.5 mLs into the skin once a week Yes Kye Oconnorus, DO   lisinopril (PRINIVIL;ZESTRIL) 5 MG tablet Take 1 tablet by mouth daily Yes Brodie Sacramento, DO   metFORMIN (GLUCOPHAGE) 1000 MG tablet Take 1 tablet by mouth 2 times daily (with meals) Yes Brodie Sacramento, DO   atorvastatin (LIPITOR) 10 MG tablet Take 1 tablet by mouth daily Yes Brodie Lalar, DO   albuterol sulfate HFA (PROVENTIL;VENTOLIN;PROAIR) 108 (90 Base) MCG/ACT inhaler INHALE 2 PUFFS UP TO 4 TIMES/DAY IF NEEDED FOR QUICK RELIEF ONLY. MAY TAKE 1-2 PUFFS BEFORE EXERCISE Yes Historical Provider, MD   sertraline (ZOLOFT) 50 MG tablet Take 1 tablet by mouth daily Yes Kye Ryan DO   esomeprazole Magnesium (NEXIUM) 20 MG PACK Take 20 mg by mouth daily Yes Historical Provider, MD       Allergies:    Patient has no known allergies.     Family History:       Problem Relation Age of Onset    Heart Failure Mother     High Blood Pressure Mother     Kidney Disease Mother     Diabetes Father     Stroke Father     High Blood Pressure Father          Health Maintenance Completed:  Health Maintenance   Topic Date Due    Pneumococcal 0-64 years Vaccine (1 - PCV) 07/01/2023 (Originally 4/6/1990)    COVID-19 Vaccine (3 - Booster for Lee Peter series) 07/01/2023 (Originally 7/6/2021)    Flu vaccine (1) 02/27/2024 (Originally 8/1/2022)    A1C test (Diabetic or Prediabetic)  05/15/2023    Diabetic Alb to Cr ratio (uACR) test  02/15/2024    Lipids  02/15/2024    GFR test (Diabetes, CKD 3-4, OR last GFR 15-59)  02/15/2024    Depression Screen  02/27/2024    DTaP/Tdap/Td vaccine (7 - Td or Tdap) 12/31/2027    Hib vaccine  Completed    Hepatitis A vaccine  Aged Out    Meningococcal (ACWY) vaccine  Aged Out    Varicella vaccine  Discontinued    Hepatitis C screen  Discontinued    HIV screen  Discontinued          Immunization History   Administered Date(s) Administered    COVID-19, PFIZER PURPLE top, DILUTE for use, (age 15 y+), 30mcg/0.3mL 04/20/2021, 05/11/2021    DTP 1985, 1985, 1985, 10/21/1985    Hepatitis B Ped/Adol (Engerix-B, Recombivax HB) 08/17/1996, 10/07/1999, 02/03/2000    Hib vaccine 11/13/1986    MMR 10/21/1985, 08/17/1996    Polio OPV 1984, 1984, 10/21/1985, 08/17/1996    Td vaccine (adult) 08/17/1996    Tdap (Boostrix, Adacel) 12/31/2017         Review of Systems:  Review of Systems   Constitutional:  Positive for fatigue. Negative for activity change, appetite change and unexpected weight change. HENT:  Negative for sinus pain and sore throat. Respiratory:  Negative for cough and shortness of breath. Cardiovascular:  Negative for chest pain, palpitations and leg swelling. Gastrointestinal:  Positive for diarrhea. Endocrine: Positive for polydipsia. Genitourinary:  Negative for difficulty urinating, dysuria and urgency. Physical Exam:   Vitals:    02/27/23 1506   BP: 122/74   Site: Left Upper Arm   Position: Sitting   Cuff Size: Large Adult   Pulse: 85   Resp: 18   Temp: 97.2 °F (36.2 °C)   TempSrc: Temporal   SpO2: 94%   Weight: (!) 323 lb 6.4 oz (146.7 kg)   Height: 5' 10\" (1.778 m)     Body mass index is 46.4 kg/m².     Wt Readings from Last 3 Encounters:   02/27/23 (!) 323 lb 6.4 oz (146.7 kg)   02/14/23 (!) 317 lb 9.6 oz (144.1 kg)   02/12/23 (!) 320 lb (145.2 kg)       BP Readings from Last 3 Encounters:   02/27/23 122/74   02/14/23 138/82   02/12/23 (!) 160/95       Physical Exam  Constitutional:       General: He is not in acute distress. Appearance: Normal appearance. He is obese. HENT:      Nose: No congestion. Mouth/Throat:      Mouth: Mucous membranes are dry. Eyes:      Extraocular Movements: Extraocular movements intact. Cardiovascular:      Rate and Rhythm: Normal rate and regular rhythm. Pulses: Normal pulses. Heart sounds: Normal heart sounds. No murmur heard. No friction rub. No gallop. Pulmonary:      Effort: Pulmonary effort is normal.      Breath sounds: Normal breath sounds. Abdominal:      General: Bowel sounds are normal.      Palpations: Abdomen is soft. Musculoskeletal:         General: No swelling. Skin:     General: Skin is warm and dry. Neurological:      General: No focal deficit present. Mental Status: He is alert.             Lab Review:   Hospital Outpatient Visit on 02/15/2023   Component Date Value    Sodium 02/15/2023 133 (A)     Potassium 02/15/2023 3.8     Chloride 02/15/2023 97 (A)     CO2 02/15/2023 19 (A)     Anion Gap 02/15/2023 17 (A)     Glucose 02/15/2023 329 (A)     BUN 02/15/2023 14     Creatinine 02/15/2023 0.9     Est, Glom Filt Rate 02/15/2023 >60     Calcium 02/15/2023 9.3     Total Protein 02/15/2023 7.1     Albumin 02/15/2023 4.3     Albumin/Globulin Ratio 02/15/2023 1.5     Total Bilirubin 02/15/2023 0.9     Alkaline Phosphatase 02/15/2023 105     ALT 02/15/2023 30     AST 02/15/2023 25     Microalbumin, Random Uri* 02/15/2023 2.10 (A)     Creatinine, Ur 02/15/2023 114.5     Microalbumin Creatinine * 02/15/2023 18.3     Hemoglobin A1C 02/15/2023 9.1     eAG 02/15/2023 214.5     Cholesterol, Total 02/15/2023 168     Triglycerides 02/15/2023 309 (A)     HDL 02/15/2023 24 (A)     LDL Calculated 02/15/2023 see below     VLDL Cholesterol Calcula* 02/15/2023 see below     LDL Direct 02/15/2023 106 (A)    Admission on 02/12/2023, Discharged on 02/12/2023   Component Date Value    POC Glucose 02/12/2023 369 (A)     Performed on 02/12/2023 ACCU-CHEK     WBC 02/12/2023 10.2     RBC 02/12/2023 5.53     Hemoglobin 02/12/2023 16.8     Hematocrit 02/12/2023 47.3     MCV 02/12/2023 85.6     MCH 02/12/2023 30.4     MCHC 02/12/2023 35.5     RDW 02/12/2023 13.3     Platelets 14/98/2511 285     MPV 02/12/2023 9.1     Neutrophils % 02/12/2023 74.5     Lymphocytes % 02/12/2023 18.5     Monocytes % 02/12/2023 5.0     Eosinophils % 02/12/2023 1.2     Basophils % 02/12/2023 0.8     Neutrophils Absolute 02/12/2023 7.6     Lymphocytes Absolute 02/12/2023 1.9     Monocytes Absolute 02/12/2023 0.5     Eosinophils Absolute 02/12/2023 0.1     Basophils Absolute 02/12/2023 0.1     Sodium 02/12/2023 128 (A)     Potassium reflex Magnesi* 02/12/2023 3.7     Chloride 02/12/2023 87 (A)     CO2 02/12/2023 25     Anion Gap 02/12/2023 16     Glucose 02/12/2023 395 (A)     BUN 02/12/2023 12     Creatinine 02/12/2023 1.0     Est, Glom Filt Rate 02/12/2023 >60     Calcium 02/12/2023 9.6     Total Protein 02/12/2023 8.2     Albumin 02/12/2023 4.8     Albumin/Globulin Ratio 02/12/2023 1.4     Total Bilirubin 02/12/2023 1.5 (A)     Alkaline Phosphatase 02/12/2023 142 (A)     ALT 02/12/2023 36     AST 02/12/2023 25     Color, UA 02/12/2023 Yellow     Clarity, UA 02/12/2023 Clear     Glucose, Ur 02/12/2023 >=1000 (A)     Bilirubin Urine 02/12/2023 MODERATE (A)     Ketones, Urine 02/12/2023 40 (A)     Specific Dequincy, UA 02/12/2023 1.025     Blood, Urine 02/12/2023 TRACE-INTACT (A)     pH, UA 02/12/2023 5.5     Protein, UA 02/12/2023 30 (A)     Urobilinogen, Urine 02/12/2023 0.2     Nitrite, Urine 02/12/2023 Negative     Leukocyte Esterase, Urine 02/12/2023 Negative     Microscopic Examination 02/12/2023 YES     Urine Type 02/12/2023 NotGiven     Urine Reflex to Culture 02/12/2023 Not Indicated     pH, Nathan 02/12/2023 7.323 (A)     pCO2, Nathan 02/12/2023 44.7     pO2, Nathan 02/12/2023 22.7 (A)     HCO3, Venous 02/12/2023 22.7 (A)     Base Excess, Nathan 02/12/2023 -3.5 (A)     O2 Sat, Nathan 02/12/2023 35 Carboxyhemoglobin 02/12/2023 3.4 (A)     MetHgb, Nathan 02/12/2023 0.3     TC02 (Calc), Nathan 02/12/2023 24     O2 Content, Nathan 02/12/2023 10     O2 Therapy 02/12/2023 Unknown     Beta-Hydroxybutyrate 02/12/2023 3.40 (A)     Lactic Acid, Sepsis 02/12/2023 1.5     WBC, UA 02/12/2023 None seen     RBC, UA 02/12/2023 0-2     Epithelial Cells, UA 02/12/2023 0-1     Crystals, UA 02/12/2023 Few Ca. Oxalate (A)     Glucose 02/12/2023 293     QC OK? 02/12/2023 ok     POC Glucose 02/12/2023 303 (A)     Performed on 02/12/2023 ACCU-CHEK     POC Glucose 02/12/2023 293 (A)     Performed on 02/12/2023 ACCU-CHEK           Assessment/Plan:  Mel Metcalf was seen today for diabetes. Diagnoses and all orders for this visit:    Type 2 diabetes mellitus without complication, without long-term current use of insulin (HCC)  -     Not at goal. Last A1c at 9.1  - ondansetron (ZOFRAN) 4 MG tablet; Take 1 tablet by mouth every 8 hours as needed for Nausea  -     Tirzepatide (MOUNJARO) 2.5 MG/0.5ML SOPN SC injection; Inject 0.5 mLs into the skin once a week    Class 3 severe obesity due to excess calories without serious comorbidity with body mass index (BMI) of 45.0 to 49.9 in adult [facility])  - Not at goal.   - Counseled on lifestyle changes    Other fatigue  -     Not at goal.   - TSH with Reflex; Future  -     Testosterone, free, total; Future    History of low testosterone in male  -     Testosterone, free, total; Future        Health Maintenance Due:  There are no preventive care reminders to display for this patient. Health care decision maker:  <72years old    Health Maintenance: (USPSTF Recommendations)  Hep C Screen: (18-79 yr old (B)): pt declined  [address] Screen: (all pts with cirrhosis and high risk Hep B (US q6 mo)): pt declined  Immunizations: pt declined all immunizations today    RTC:  Return in about 4 weeks (around 3/27/2023) for diabetes follow up.        RESIDENT/ATTENDING ATTESTATION:    After medical student evaluation and exam, I independently gathered patients history, independently did a physical, and agree with A/P as written in medical student's note (other than clarified below). Please see below for additional information documented by the resident/attending including the A/P. Assessment/Plan:  Diagnoses and all orders for this visit:  Type 2 diabetes mellitus without complication, without long-term current use of insulin (HCC)  Class 3 severe obesity due to excess calories without serious comorbidity with body mass index (BMI) of 45.0 to 49.9 in adult [facility])  -New diagnosis, not yet at goal  -A1c 9.1%  -Continue metformin 100 mg BID, discussed importance of taking twice daily and that it is not necessary to monitor glucose with metformin and he should not let a normal glucose reading keep him from taking medication  -Discussed switching metformin to ER formulary to help with GI side effects and nausea, pt declined at this time - will refill zofran today but emphasized to pt that if nausea is persisting longterm we should switch to ER formulary  instead of continuing zofran   -Start mounjaro 2.5 mg weekly for DM management and weight loss, will titrate as tolerated  -Consider addition of SGLT-2 at next visit for triple therapy   -Continue lisinopril and atorvastatin  -Follow up in 4 weeks  -     ondansetron (ZOFRAN) 4 MG tablet; Take 1 tablet by mouth every 8 hours as needed for Nausea  -     Tirzepatide (MOUNJARO) 2.5 MG/0.5ML SOPN SC injection; Inject 0.5 mLs into the skin once a week    Other fatigue  History of low testosterone in male  -Not at goal  -Likely sx of uncontrolled diabetes  -Recent CBC unremarkable   -RADHA questionnaire suggestive of testosterone deficiency, based on this and hx of low testosterone, will check level - emphasized importance of going around 8 am  -Will also check TSH  -Further management based on lab results   -     TSH with Reflex;  Future  -     Testosterone, free, total; Future      EMR Dragon/transcription disclaimer:  Much of this encounter note is electronic transcription/translation of spoken language to printed texts. The electronic translation of spoken language may be erroneous, or at times, nonsensical words or phrases may be inadvertently transcribed.   Although I have reviewed the note for such errors, some may still exist.      Mariaa Del Toro DO, PGY-2  [address] [facility] Medicine Residency Program

## 2023-03-06 ENCOUNTER — HOSPITAL ENCOUNTER (OUTPATIENT)
Age: 38
Discharge: HOME | End: 2023-03-06
Payer: COMMERCIAL

## 2023-03-06 DIAGNOSIS — R79.89 LOW TESTOSTERONE IN MALE: ICD-10-CM

## 2023-03-06 DIAGNOSIS — R53.83 OTHER FATIGUE: ICD-10-CM

## 2023-03-06 LAB — TSH REFLEX: 1.77 UIU/ML (ref 0.27–4.2)

## 2023-03-06 PROCEDURE — 84270 ASSAY OF SEX HORMONE GLOBUL: CPT

## 2023-03-06 PROCEDURE — 36415 COLL VENOUS BLD VENIPUNCTURE: CPT

## 2023-03-06 PROCEDURE — 84403 ASSAY OF TOTAL TESTOSTERONE: CPT

## 2023-03-06 PROCEDURE — 84443 ASSAY THYROID STIM HORMONE: CPT

## 2023-03-06 RX ORDER — DULAGLUTIDE 0.75 MG/.5ML
0.75 INJECTION, SOLUTION SUBCUTANEOUS WEEKLY
Qty: 4 ADJUSTABLE DOSE PRE-FILLED PEN SYRINGE | Refills: 0 | Status: SHIPPED | OUTPATIENT
Start: 2023-03-06

## 2023-03-06 NOTE — TELEPHONE ENCOUNTER
Looks like trulicity should be covered. Will you please pend/send to me as prescription request to fill? Start with 0.75 mg dose, 4 pens. Thanks!

## 2023-03-06 NOTE — TELEPHONE ENCOUNTER
Noted.      Dr. Gareth Cooper pt's insurance denied Poornima Stager do you want to try another medication ?

## 2023-03-07 NOTE — TELEPHONE ENCOUNTER
Medication was pended yesterday for Dr. Chantel Hebert to sign.    I called pt and informed him replace medication trulicity was sent to pharmacy to replace [facility]

## 2023-03-08 LAB
SEX HORMONE BINDING GLOBULIN: 27 NMOL/L (ref 11–80)
TESTOSTERONE FREE-NONMALE: 72.2 PG/ML (ref 47–244)
TESTOSTERONE TOTAL: 325 NG/DL (ref 220–1000)

## 2023-03-13 RX ORDER — ATORVASTATIN CALCIUM 10 MG/1
TABLET, FILM COATED ORAL
Qty: 30 TABLET | Refills: 1 | OUTPATIENT
Start: 2023-03-13

## 2023-03-13 RX ORDER — LISINOPRIL 5 MG/1
TABLET ORAL
Qty: 30 TABLET | Refills: 1 | OUTPATIENT
Start: 2023-03-13

## 2023-03-13 RX ORDER — ATORVASTATIN CALCIUM 10 MG/1
10 TABLET, FILM COATED ORAL DAILY
Qty: 30 TABLET | Refills: 1 | OUTPATIENT
Start: 2023-03-13

## 2023-03-13 RX ORDER — LISINOPRIL 5 MG/1
5 TABLET ORAL DAILY
Qty: 30 TABLET | Refills: 1 | OUTPATIENT
Start: 2023-03-13

## 2023-03-13 NOTE — TELEPHONE ENCOUNTER
Refill Request       Last Seen: Last Seen Department: 2/27/2023  Last Seen by PCP: 2/27/2023    Last Written: atorvastatin (LIPITOR) 10 MG tablet-2/16/2023 30 with 1 refill  lisinopril (PRINIVIL;ZESTRIL) 5 MG tablet-2/16/2023 30 with 1 refill   metFORMIN (GLUCOPHAGE) 1000 MG tablet-2/16/2023 60 with 1 refill  Next Appointment:   Future Appointments   Date Time Provider Boris Leonardo   3/27/2023  3:00 PM Farhan Polanco [facility] AND RES [facility]   5/11/2023  4:00 PM Komal Cleaning [facility] AND RES [facility]             Requested Prescriptions     Pending Prescriptions Disp Refills    atorvastatin (LIPITOR) 10 MG tablet 30 tablet 1     Sig: Take 1 tablet by mouth daily    lisinopril (PRINIVIL;ZESTRIL) 5 MG tablet 30 tablet 1     Sig: Take 1 tablet by mouth daily    metFORMIN (GLUCOPHAGE) 1000 MG tablet 60 tablet 1     Sig: Take 1 tablet by mouth 2 times daily (with meals)

## 2023-03-28 DIAGNOSIS — E11.9 TYPE 2 DIABETES MELLITUS WITHOUT COMPLICATION, WITHOUT LONG-TERM CURRENT USE OF INSULIN (HCC): ICD-10-CM

## 2023-03-28 RX ORDER — DULAGLUTIDE 0.75 MG/.5ML
INJECTION, SOLUTION SUBCUTANEOUS
Qty: 4 ADJUSTABLE DOSE PRE-FILLED PEN SYRINGE | Refills: 0 | Status: SHIPPED | OUTPATIENT
Start: 2023-03-28 | End: 2023-04-13 | Stop reason: DRUGHIGH

## 2023-04-05 RX ORDER — LISINOPRIL 5 MG/1
TABLET ORAL
Qty: 30 TABLET | Refills: 1 | OUTPATIENT
Start: 2023-04-05

## 2023-04-05 RX ORDER — ATORVASTATIN CALCIUM 10 MG/1
TABLET, FILM COATED ORAL
Qty: 30 TABLET | Refills: 1 | Status: SHIPPED | OUTPATIENT
Start: 2023-04-05

## 2023-04-05 NOTE — TELEPHONE ENCOUNTER
Refill Request       Last Seen: Last Seen Department: 2/27/2023  Last Seen by PCP: 2/14/2023    Last Written: 2/16/23    Next Appointment:   Future Appointments   Date Time Provider Boris Leonardo   4/13/2023  9:00 AM Kelsey Barksdale [facility] AND RES [facility]   5/11/2023  4:00 PM Komal Cleaning [facility] AND RES [facility]       Future appointment scheduled      Requested Prescriptions     Pending Prescriptions Disp Refills    atorvastatin (LIPITOR) 10 MG tablet [Pharmacy Med Name: ATORVASTATIN 10 MG TABLET] 30 tablet 1     Sig: TAKE 1 TABLET BY MOUTH EVERY DAY

## 2023-05-09 RX ORDER — ATORVASTATIN CALCIUM 10 MG/1
TABLET, FILM COATED ORAL
Qty: 30 TABLET | Refills: 1 | Status: SHIPPED | OUTPATIENT
Start: 2023-05-09 | End: 2023-06-01

## 2023-05-11 NOTE — TELEPHONE ENCOUNTER
Refill Request       Last Seen: Last Seen Department: 4/13/2023  Last Seen by PCP: 4/13/2023    Last Written: 2/16/2023 60 with 1 refill     Next Appointment:   Future Appointments   Date Time Provider Boris Leonardo   7/14/2023 12:30 PM Jackie Medley DO [facility] AND RES [facility]             Requested Prescriptions     Pending Prescriptions Disp Refills    metFORMIN (GLUCOPHAGE) 1000 MG tablet 60 tablet 1     Sig: Take 1 tablet by mouth 2 times daily (with meals)

## 2023-05-19 ENCOUNTER — NURSE ONLY (OUTPATIENT)
Dept: PRIMARY CARE CLINIC | Age: 38
End: 2023-05-19

## 2023-05-19 VITALS — HEART RATE: 96 BPM | OXYGEN SATURATION: 99 % | TEMPERATURE: 98.4 F

## 2023-05-19 DIAGNOSIS — J02.9 ACUTE SORE THROAT: Primary | ICD-10-CM

## 2023-05-19 LAB — S PYO AG THROAT QL: NORMAL

## 2023-05-21 LAB — BACTERIA THROAT AEROBE CULT: NORMAL

## 2023-05-22 LAB — BACTERIA THROAT AEROBE CULT: NORMAL

## 2023-06-01 RX ORDER — ATORVASTATIN CALCIUM 10 MG/1
TABLET, FILM COATED ORAL
Qty: 30 TABLET | Refills: 1 | Status: SHIPPED | OUTPATIENT
Start: 2023-06-01

## 2023-06-01 NOTE — TELEPHONE ENCOUNTER
.Refill Request       Last Seen: Last Seen Department: 4/13/2023  Last Seen by PCP: Visit date not found    Last Written: 5/9/2023 30 with 1 refill     Next Appointment:   Future Appointments   Date Time Provider Boris Leonardo   7/14/2023 12:30 PM Douglas Vernon DO [facility] AND RES [facility]             Requested Prescriptions     Pending Prescriptions Disp Refills    atorvastatin (LIPITOR) 10 MG tablet [Pharmacy Med Name: ATORVASTATIN 10 MG TABLET] 30 tablet 1     Sig: TAKE 1 TABLET BY MOUTH EVERY DAY

## 2023-07-05 DIAGNOSIS — E11.9 TYPE 2 DIABETES MELLITUS WITHOUT COMPLICATION, WITHOUT LONG-TERM CURRENT USE OF INSULIN (HCC): ICD-10-CM

## 2023-07-05 RX ORDER — DULAGLUTIDE 1.5 MG/.5ML
INJECTION, SOLUTION SUBCUTANEOUS
Qty: 2 ADJUSTABLE DOSE PRE-FILLED PEN SYRINGE | Refills: 2 | Status: SHIPPED | OUTPATIENT
Start: 2023-07-05

## 2023-07-05 RX ORDER — ATORVASTATIN CALCIUM 10 MG/1
TABLET, FILM COATED ORAL
Qty: 30 TABLET | Refills: 3 | Status: SHIPPED | OUTPATIENT
Start: 2023-07-05

## 2023-07-05 NOTE — TELEPHONE ENCOUNTER
Refill Request       Last Seen: Last Seen Department: 4/13/2023  Last Seen by PCP: 4/13/2023    Last Written: 6/1/2023  #30 with 1    Next Appointment:   Future Appointments   Date Time Provider [address]   7/14/2023 12:30 PM Chitra Peralta DO [facility] AND RES [facility]             Requested Prescriptions     Pending Prescriptions Disp Refills    atorvastatin (LIPITOR) 10 MG tablet [Pharmacy Med Name: ATORVASTATIN 10 MG TABLET] 30 tablet 1     Sig: TAKE 1 TABLET BY MOUTH EVERY DAY

## 2023-07-05 NOTE — TELEPHONE ENCOUNTER
Refill Request       Last Seen: Last Seen Department: 4/13/2023  Last Seen by PCP: 4/13/2023    Last Written: 4/13/2023  #4 with 2    Next Appointment:   Future Appointments   Date Time Provider [address]   7/14/2023 12:30 PM Sarath Shell DO [facility] AND RES [facility]           Requested Prescriptions     Pending Prescriptions Disp Refills    TRULICITY 1.5 MA/6.3KM SC injection [Pharmacy Med Name: TRULICITY 1.5 HV/5.1 ML PEN]  2     Sig: INJECT 0.5 ML SUBCUTANEOUSLY ONE TIME PER WEEK

## 2023-07-14 ENCOUNTER — OFFICE VISIT (OUTPATIENT)
Dept: PRIMARY CARE CLINIC | Age: 38
End: 2023-07-14
Payer: COMMERCIAL

## 2023-07-14 VITALS
BODY MASS INDEX: 43.96 KG/M2 | DIASTOLIC BLOOD PRESSURE: 82 MMHG | SYSTOLIC BLOOD PRESSURE: 138 MMHG | HEART RATE: 87 BPM | OXYGEN SATURATION: 99 % | WEIGHT: 306.4 LBS | TEMPERATURE: 97.4 F

## 2023-07-14 DIAGNOSIS — L08.9 INFECTED NASAL PIERCING: ICD-10-CM

## 2023-07-14 DIAGNOSIS — S01.23XA INFECTED NASAL PIERCING: ICD-10-CM

## 2023-07-14 DIAGNOSIS — E11.9 TYPE 2 DIABETES MELLITUS WITHOUT COMPLICATION, WITHOUT LONG-TERM CURRENT USE OF INSULIN (HCC): Primary | ICD-10-CM

## 2023-07-14 DIAGNOSIS — Q61.3 POLYCYSTIC KIDNEY DISEASE: ICD-10-CM

## 2023-07-14 DIAGNOSIS — L91.0 KELOID OF SKIN: ICD-10-CM

## 2023-07-14 DIAGNOSIS — E66.01 CLASS 3 SEVERE OBESITY DUE TO EXCESS CALORIES WITHOUT SERIOUS COMORBIDITY WITH BODY MASS INDEX (BMI) OF 40.0 TO 44.9 IN ADULT (HCC): ICD-10-CM

## 2023-07-14 PROCEDURE — 3044F HG A1C LEVEL LT 7.0%: CPT

## 2023-07-14 PROCEDURE — 99214 OFFICE O/P EST MOD 30 MIN: CPT

## 2023-07-14 RX ORDER — DOXYCYCLINE HYCLATE 100 MG
100 TABLET ORAL 2 TIMES DAILY
Qty: 10 TABLET | Refills: 0 | Status: SHIPPED | OUTPATIENT
Start: 2023-07-14 | End: 2023-07-19

## 2023-07-14 RX ORDER — DULAGLUTIDE 3 MG/.5ML
3 INJECTION, SOLUTION SUBCUTANEOUS WEEKLY
Qty: 4 ADJUSTABLE DOSE PRE-FILLED PEN SYRINGE | Refills: 2 | Status: SHIPPED | OUTPATIENT
Start: 2023-07-14

## 2023-07-14 ASSESSMENT — PATIENT HEALTH QUESTIONNAIRE - PHQ9
1. LITTLE INTEREST OR PLEASURE IN DOING THINGS: 0
5. POOR APPETITE OR OVEREATING: 0
2. FEELING DOWN, DEPRESSED OR HOPELESS: 0
6. FEELING BAD ABOUT YOURSELF - OR THAT YOU ARE A FAILURE OR HAVE LET YOURSELF OR YOUR FAMILY DOWN: 0
7. TROUBLE CONCENTRATING ON THINGS, SUCH AS READING THE NEWSPAPER OR WATCHING TELEVISION: 0
SUM OF ALL RESPONSES TO PHQ QUESTIONS 1-9: 0
SUM OF ALL RESPONSES TO PHQ QUESTIONS 1-9: 0
10. IF YOU CHECKED OFF ANY PROBLEMS, HOW DIFFICULT HAVE THESE PROBLEMS MADE IT FOR YOU TO DO YOUR WORK, TAKE CARE OF THINGS AT HOME, OR GET ALONG WITH OTHER PEOPLE: 0
9. THOUGHTS THAT YOU WOULD BE BETTER OFF DEAD, OR OF HURTING YOURSELF: 0
3. TROUBLE FALLING OR STAYING ASLEEP: 0
SUM OF ALL RESPONSES TO PHQ QUESTIONS 1-9: 0
SUM OF ALL RESPONSES TO PHQ9 QUESTIONS 1 & 2: 0
8. MOVING OR SPEAKING SO SLOWLY THAT OTHER PEOPLE COULD HAVE NOTICED. OR THE OPPOSITE, BEING SO FIGETY OR RESTLESS THAT YOU HAVE BEEN MOVING AROUND A LOT MORE THAN USUAL: 0
SUM OF ALL RESPONSES TO PHQ QUESTIONS 1-9: 0
4. FEELING TIRED OR HAVING LITTLE ENERGY: 0

## 2023-07-14 ASSESSMENT — ANXIETY QUESTIONNAIRES
IF YOU CHECKED OFF ANY PROBLEMS ON THIS QUESTIONNAIRE, HOW DIFFICULT HAVE THESE PROBLEMS MADE IT FOR YOU TO DO YOUR WORK, TAKE CARE OF THINGS AT HOME, OR GET ALONG WITH OTHER PEOPLE: NOT DIFFICULT AT ALL
2. NOT BEING ABLE TO STOP OR CONTROL WORRYING: 0
GAD7 TOTAL SCORE: 0
1. FEELING NERVOUS, ANXIOUS, OR ON EDGE: 0
6. BECOMING EASILY ANNOYED OR IRRITABLE: 0
3. WORRYING TOO MUCH ABOUT DIFFERENT THINGS: 0
4. TROUBLE RELAXING: 0
5. BEING SO RESTLESS THAT IT IS HARD TO SIT STILL: 0
7. FEELING AFRAID AS IF SOMETHING AWFUL MIGHT HAPPEN: 0

## 2023-10-12 DIAGNOSIS — E11.9 TYPE 2 DIABETES MELLITUS WITHOUT COMPLICATION, WITHOUT LONG-TERM CURRENT USE OF INSULIN (HCC): ICD-10-CM

## 2023-10-12 RX ORDER — LISINOPRIL 5 MG/1
5 TABLET ORAL DAILY
Qty: 90 TABLET | Refills: 1 | Status: SHIPPED | OUTPATIENT
Start: 2023-10-12

## 2023-10-12 NOTE — TELEPHONE ENCOUNTER
Refill Request       Last Seen: Last Seen Department: 7/14/2023  Last Seen by PCP: 7/14/2023    Last Written: 04/13/23 qty 90 w/ 1; 04/13/23 qty 30 w/ 5    Next Appointment:   Future Appointments   Date Time Provider [address] [place] [address]   10/16/2023 10:00 AM Belen Weathers DO [address] [facility]       Future appointment scheduled      Requested Prescriptions     Pending Prescriptions Disp Refills    lisinopril (PRINIVIL;ZESTRIL) 5 MG tablet [Pharmacy Med Name: LISINOPRIL 5 MG TABLET] 90 tablet 1     Sig: TAKE 1 TABLET BY MOUTH EVERY DAY    sertraline (ZOLOFT) 50 MG tablet [Pharmacy Med Name: SERTRALINE HCL 50 MG TABLET] 90 tablet 1     Sig: TAKE 1 TABLET BY MOUTH EVERY DAY

## 2023-10-13 RX ORDER — ATORVASTATIN CALCIUM 10 MG/1
TABLET, FILM COATED ORAL
Qty: 90 TABLET | Refills: 1 | Status: SHIPPED | OUTPATIENT
Start: 2023-10-13

## 2023-10-13 NOTE — TELEPHONE ENCOUNTER
Refill Request     CONFIRM preferred pharmacy with the patient. If Mail Order Rx - Pend for 90 day refill. Last Seen: Last Seen Department: 7/14/2023  Last Seen by PCP: 7/14/2023    Last Written: 7/5/2023    If no future appointment scheduled:  Review the last OV with PCP and review information for follow-up visit,  Route STAFF MESSAGE with patient name to the [facility] for scheduling with the following information:            -  Timing of next visit           -  Visit type ie Physical, OV, etc           -  Diagnoses/Reason ie. COPD, HTN - Do not use MEDICATION, Follow-up or CHECK UP - Give reason for visit      Next Appointment:   Future Appointments   Date Time Provider [address]   10/16/2023 10:00 AM Angeline Curtis [facility]       Message sent to [address] [place] [address] to schedule appt with patient?   N/A      Requested Prescriptions     Pending Prescriptions Disp Refills    atorvastatin (LIPITOR) 10 MG tablet [Pharmacy Med Name: ATORVASTATIN 10 MG TABLET] 90 tablet 1     Sig: TAKE 1 TABLET BY MOUTH EVERY DAY

## 2023-10-16 ENCOUNTER — OFFICE VISIT (OUTPATIENT)
Dept: PRIMARY CARE CLINIC | Age: 38
End: 2023-10-16
Payer: COMMERCIAL

## 2023-10-16 VITALS
OXYGEN SATURATION: 98 % | WEIGHT: 306.6 LBS | TEMPERATURE: 97.2 F | SYSTOLIC BLOOD PRESSURE: 128 MMHG | HEART RATE: 96 BPM | BODY MASS INDEX: 43.99 KG/M2 | DIASTOLIC BLOOD PRESSURE: 92 MMHG

## 2023-10-16 DIAGNOSIS — Z23 NEEDS FLU SHOT: ICD-10-CM

## 2023-10-16 DIAGNOSIS — Q61.3 POLYCYSTIC KIDNEY DISEASE: ICD-10-CM

## 2023-10-16 DIAGNOSIS — E11.9 TYPE 2 DIABETES MELLITUS WITHOUT COMPLICATION, WITHOUT LONG-TERM CURRENT USE OF INSULIN (HCC): Primary | ICD-10-CM

## 2023-10-16 DIAGNOSIS — E66.01 CLASS 3 SEVERE OBESITY DUE TO EXCESS CALORIES WITHOUT SERIOUS COMORBIDITY WITH BODY MASS INDEX (BMI) OF 40.0 TO 44.9 IN ADULT (HCC): ICD-10-CM

## 2023-10-16 PROCEDURE — 3044F HG A1C LEVEL LT 7.0%: CPT

## 2023-10-16 PROCEDURE — 99213 OFFICE O/P EST LOW 20 MIN: CPT

## 2023-10-16 RX ORDER — DULAGLUTIDE 4.5 MG/.5ML
4.5 INJECTION, SOLUTION SUBCUTANEOUS WEEKLY
Qty: 4 ADJUSTABLE DOSE PRE-FILLED PEN SYRINGE | Refills: 2 | Status: SHIPPED | OUTPATIENT
Start: 2023-10-16

## 2023-10-16 ASSESSMENT — ANXIETY QUESTIONNAIRES
IF YOU CHECKED OFF ANY PROBLEMS ON THIS QUESTIONNAIRE, HOW DIFFICULT HAVE THESE PROBLEMS MADE IT FOR YOU TO DO YOUR WORK, TAKE CARE OF THINGS AT HOME, OR GET ALONG WITH OTHER PEOPLE: NOT DIFFICULT AT ALL
7. FEELING AFRAID AS IF SOMETHING AWFUL MIGHT HAPPEN: 0
1. FEELING NERVOUS, ANXIOUS, OR ON EDGE: 0
GAD7 TOTAL SCORE: 0
2. NOT BEING ABLE TO STOP OR CONTROL WORRYING: 0
3. WORRYING TOO MUCH ABOUT DIFFERENT THINGS: 0
4. TROUBLE RELAXING: 0
5. BEING SO RESTLESS THAT IT IS HARD TO SIT STILL: 0
6. BECOMING EASILY ANNOYED OR IRRITABLE: 0

## 2023-10-16 ASSESSMENT — PATIENT HEALTH QUESTIONNAIRE - PHQ9
2. FEELING DOWN, DEPRESSED OR HOPELESS: 0
SUM OF ALL RESPONSES TO PHQ QUESTIONS 1-9: 0
9. THOUGHTS THAT YOU WOULD BE BETTER OFF DEAD, OR OF HURTING YOURSELF: 0
SUM OF ALL RESPONSES TO PHQ9 QUESTIONS 1 & 2: 0
4. FEELING TIRED OR HAVING LITTLE ENERGY: 0
3. TROUBLE FALLING OR STAYING ASLEEP: 0
6. FEELING BAD ABOUT YOURSELF - OR THAT YOU ARE A FAILURE OR HAVE LET YOURSELF OR YOUR FAMILY DOWN: 0
8. MOVING OR SPEAKING SO SLOWLY THAT OTHER PEOPLE COULD HAVE NOTICED. OR THE OPPOSITE, BEING SO FIGETY OR RESTLESS THAT YOU HAVE BEEN MOVING AROUND A LOT MORE THAN USUAL: 0
7. TROUBLE CONCENTRATING ON THINGS, SUCH AS READING THE NEWSPAPER OR WATCHING TELEVISION: 0
10. IF YOU CHECKED OFF ANY PROBLEMS, HOW DIFFICULT HAVE THESE PROBLEMS MADE IT FOR YOU TO DO YOUR WORK, TAKE CARE OF THINGS AT HOME, OR GET ALONG WITH OTHER PEOPLE: 0
5. POOR APPETITE OR OVEREATING: 0
1. LITTLE INTEREST OR PLEASURE IN DOING THINGS: 0
SUM OF ALL RESPONSES TO PHQ QUESTIONS 1-9: 0

## 2023-11-28 ENCOUNTER — PATIENT MESSAGE (OUTPATIENT)
Dept: PRIMARY CARE CLINIC | Age: 38
End: 2023-11-28

## 2023-11-28 NOTE — TELEPHONE ENCOUNTER
From: Hong Connelly  To: Cori Vo DO  Sent: 11/28/2023 8:11 AM EST  Subject: Sleep study    My girlfriend thinks I have sleep apnea and wants me to get a sleep study. . I have no idea how to go about this

## 2023-12-04 ENCOUNTER — OFFICE VISIT (OUTPATIENT)
Dept: PRIMARY CARE CLINIC | Age: 38
End: 2023-12-04
Payer: COMMERCIAL

## 2023-12-04 VITALS
TEMPERATURE: 97.7 F | OXYGEN SATURATION: 98 % | HEART RATE: 77 BPM | SYSTOLIC BLOOD PRESSURE: 128 MMHG | DIASTOLIC BLOOD PRESSURE: 60 MMHG | BODY MASS INDEX: 45.05 KG/M2 | WEIGHT: 314 LBS

## 2023-12-04 DIAGNOSIS — G43.009 MIGRAINE WITHOUT AURA AND WITHOUT STATUS MIGRAINOSUS, NOT INTRACTABLE: ICD-10-CM

## 2023-12-04 DIAGNOSIS — R06.83 SNORING: Primary | ICD-10-CM

## 2023-12-04 PROCEDURE — 99214 OFFICE O/P EST MOD 30 MIN: CPT

## 2023-12-04 RX ORDER — SUMATRIPTAN 50 MG/1
50 TABLET, FILM COATED ORAL
Qty: 9 TABLET | Refills: 0 | Status: SHIPPED | OUTPATIENT
Start: 2023-12-04 | End: 2023-12-07

## 2023-12-04 ASSESSMENT — ANXIETY QUESTIONNAIRES
3. WORRYING TOO MUCH ABOUT DIFFERENT THINGS: 0
2. NOT BEING ABLE TO STOP OR CONTROL WORRYING: 0
5. BEING SO RESTLESS THAT IT IS HARD TO SIT STILL: 0
IF YOU CHECKED OFF ANY PROBLEMS ON THIS QUESTIONNAIRE, HOW DIFFICULT HAVE THESE PROBLEMS MADE IT FOR YOU TO DO YOUR WORK, TAKE CARE OF THINGS AT HOME, OR GET ALONG WITH OTHER PEOPLE: NOT DIFFICULT AT ALL
4. TROUBLE RELAXING: 0
7. FEELING AFRAID AS IF SOMETHING AWFUL MIGHT HAPPEN: 0
GAD7 TOTAL SCORE: 0
1. FEELING NERVOUS, ANXIOUS, OR ON EDGE: 0
6. BECOMING EASILY ANNOYED OR IRRITABLE: 0

## 2023-12-04 ASSESSMENT — PATIENT HEALTH QUESTIONNAIRE - PHQ9
10. IF YOU CHECKED OFF ANY PROBLEMS, HOW DIFFICULT HAVE THESE PROBLEMS MADE IT FOR YOU TO DO YOUR WORK, TAKE CARE OF THINGS AT HOME, OR GET ALONG WITH OTHER PEOPLE: 0
SUM OF ALL RESPONSES TO PHQ QUESTIONS 1-9: 0
SUM OF ALL RESPONSES TO PHQ QUESTIONS 1-9: 0
6. FEELING BAD ABOUT YOURSELF - OR THAT YOU ARE A FAILURE OR HAVE LET YOURSELF OR YOUR FAMILY DOWN: 0
SUM OF ALL RESPONSES TO PHQ QUESTIONS 1-9: 0
2. FEELING DOWN, DEPRESSED OR HOPELESS: 0
7. TROUBLE CONCENTRATING ON THINGS, SUCH AS READING THE NEWSPAPER OR WATCHING TELEVISION: 0
3. TROUBLE FALLING OR STAYING ASLEEP: 0
1. LITTLE INTEREST OR PLEASURE IN DOING THINGS: 0
SUM OF ALL RESPONSES TO PHQ9 QUESTIONS 1 & 2: 0
4. FEELING TIRED OR HAVING LITTLE ENERGY: 0
9. THOUGHTS THAT YOU WOULD BE BETTER OFF DEAD, OR OF HURTING YOURSELF: 0
8. MOVING OR SPEAKING SO SLOWLY THAT OTHER PEOPLE COULD HAVE NOTICED. OR THE OPPOSITE, BEING SO FIGETY OR RESTLESS THAT YOU HAVE BEEN MOVING AROUND A LOT MORE THAN USUAL: 0
SUM OF ALL RESPONSES TO PHQ QUESTIONS 1-9: 0
5. POOR APPETITE OR OVEREATING: 0

## 2023-12-04 ASSESSMENT — COLUMBIA-SUICIDE SEVERITY RATING SCALE - C-SSRS
3. HAVE YOU BEEN THINKING ABOUT HOW YOU MIGHT KILL YOURSELF?: NO
7. DID THIS OCCUR IN THE LAST THREE MONTHS: NO
5. HAVE YOU STARTED TO WORK OUT OR WORKED OUT THE DETAILS OF HOW TO KILL YOURSELF? DO YOU INTEND TO CARRY OUT THIS PLAN?: NO
4. HAVE YOU HAD THESE THOUGHTS AND HAD SOME INTENTION OF ACTING ON THEM?: NO

## 2023-12-07 ENCOUNTER — OFFICE VISIT (OUTPATIENT)
Dept: PULMONOLOGY | Age: 38
End: 2023-12-07
Payer: COMMERCIAL

## 2023-12-07 VITALS
RESPIRATION RATE: 16 BRPM | BODY MASS INDEX: 44.95 KG/M2 | HEART RATE: 88 BPM | WEIGHT: 314 LBS | HEIGHT: 70 IN | DIASTOLIC BLOOD PRESSURE: 118 MMHG | OXYGEN SATURATION: 98 % | TEMPERATURE: 98.6 F | SYSTOLIC BLOOD PRESSURE: 180 MMHG

## 2023-12-07 DIAGNOSIS — F41.9 ANXIETY: ICD-10-CM

## 2023-12-07 DIAGNOSIS — G47.19 EXCESSIVE DAYTIME SLEEPINESS: Primary | ICD-10-CM

## 2023-12-07 DIAGNOSIS — Q61.3 POLYCYSTIC KIDNEY DISEASE: ICD-10-CM

## 2023-12-07 DIAGNOSIS — E66.01 CLASS 3 SEVERE OBESITY DUE TO EXCESS CALORIES WITHOUT SERIOUS COMORBIDITY WITH BODY MASS INDEX (BMI) OF 40.0 TO 44.9 IN ADULT (HCC): ICD-10-CM

## 2023-12-07 DIAGNOSIS — R53.83 EASY FATIGABILITY: ICD-10-CM

## 2023-12-07 DIAGNOSIS — Z87.442 HISTORY OF RENAL STONE: ICD-10-CM

## 2023-12-07 DIAGNOSIS — R06.83 SNORING: ICD-10-CM

## 2023-12-07 PROCEDURE — 99204 OFFICE O/P NEW MOD 45 MIN: CPT | Performed by: INTERNAL MEDICINE

## 2023-12-07 ASSESSMENT — SLEEP AND FATIGUE QUESTIONNAIRES
HOW LIKELY ARE YOU TO NOD OFF OR FALL ASLEEP WHILE WATCHING TV: 3
HOW LIKELY ARE YOU TO NOD OFF OR FALL ASLEEP WHILE SITTING AND READING: 3
HOW LIKELY ARE YOU TO NOD OFF OR FALL ASLEEP WHILE SITTING AND TALKING TO SOMEONE: 0
HOW LIKELY ARE YOU TO NOD OFF OR FALL ASLEEP WHEN YOU ARE A PASSENGER IN A CAR FOR AN HOUR WITHOUT A BREAK: 2
ESS TOTAL SCORE: 11
HOW LIKELY ARE YOU TO NOD OFF OR FALL ASLEEP WHILE SITTING INACTIVE IN A PUBLIC PLACE: 0
HOW LIKELY ARE YOU TO NOD OFF OR FALL ASLEEP WHILE SITTING QUIETLY AFTER LUNCH WITHOUT ALCOHOL: 0
NECK CIRCUMFERENCE (INCHES): 18
HOW LIKELY ARE YOU TO NOD OFF OR FALL ASLEEP IN A CAR, WHILE STOPPED FOR A FEW MINUTES IN TRAFFIC: 0
HOW LIKELY ARE YOU TO NOD OFF OR FALL ASLEEP WHILE LYING DOWN TO REST IN THE AFTERNOON WHEN CIRCUMSTANCES PERMIT: 3

## 2023-12-07 NOTE — PROGRESS NOTES
MA Communication:   The following orders are received by verbal communication from David Weeks MD    Orders include:  Home sleep study ordered will call with results                            BP retaken 148/105  Pt was at  Monday /60
Relevant systems reviewed and the only positive symptoms are mentioned in the history present illness. Past Medical History:   Diagnosis Date    Anxiety     Chronic back pain     Heartburn         Past Surgical History:   Procedure Laterality Date    ANKLE SURGERY Left 2019        Family History   Problem Relation Age of Onset    Heart Failure Mother     High Blood Pressure Mother     Kidney Disease Mother     Diabetes Father     Stroke Father     High Blood Pressure Father        Social History     Tobacco Use    Smoking status: Former     Packs/day: 1.00     Years: 13.00     Additional pack years: 0.00     Total pack years: 13.00     Types: Cigarettes     Quit date: 2015     Years since quittin.9    Smokeless tobacco: Never   Vaping Use    Vaping Use: Never used   Substance Use Topics    Alcohol use: Yes     Comment: rarely    Drug use: Never       No Known Allergies      Current Outpatient Medications:     SUMAtriptan (IMITREX) 50 MG tablet, Take 1 tablet by mouth once as needed for Migraine, Disp: 9 tablet, Rfl: 0    Dulaglutide (TRULICITY) 4.5 KL/8.8TK SOPN, Inject 4.5 mg into the skin once a week, Disp: 4 Adjustable Dose Pre-filled Pen Syringe, Rfl: 2    atorvastatin (LIPITOR) 10 MG tablet, TAKE 1 TABLET BY MOUTH EVERY DAY, Disp: 90 tablet, Rfl: 1    lisinopril (PRINIVIL;ZESTRIL) 5 MG tablet, TAKE 1 TABLET BY MOUTH EVERY DAY, Disp: 90 tablet, Rfl: 1    sertraline (ZOLOFT) 50 MG tablet, TAKE 1 TABLET BY MOUTH EVERY DAY, Disp: 90 tablet, Rfl: 1    esomeprazole Magnesium (NEXIUM) 20 MG PACK, Take 1 packet by mouth daily, Disp: , Rfl:     albuterol sulfate HFA (PROVENTIL;VENTOLIN;PROAIR) 108 (90 Base) MCG/ACT inhaler, INHALE 2 PUFFS UP TO 4 TIMES/DAY IF NEEDED FOR QUICK RELIEF ONLY. MAY TAKE 1-2 PUFFS BEFORE EXERCISE (Patient not taking: Reported on 2023), Disp: , Rfl:        Test Results:  Radiology:  I have reviewed radiology images personally.     N/A      PFTs/Oxygen

## 2023-12-07 NOTE — PATIENT INSTRUCTIONS
Home sleep apnea test  Sleep apnea education provided  Repeat blood pressure before discharge    Health Maintenance/Preventive measures:        >>  Avoid exposure to tobacco, irritants, allergens as possible as well as contact with patients with infectious respiratory illness. >>  Stay up-to-date with influenza & pneumonia vaccines, RSV, & COVID-19 vaccine as recommended by the Advisory Committee on Immunization Practices (ACIP)        >>  Healthy diet and activity as able. >>  Acid reflux precautions: Head of bed elevation, avoiding tight clothes, avoiding big meals or snacking 3 hours before bedtime, targeting healthy weight.        >>  Practice sleep hygiene measures. Avoid driving or operating heavy machines if tired or sleepy. Remember to bring a list of pulmonary medications and any CPAP or BiPAP machines to your next appointment with the office. Please keep all of your future appointments scheduled by [facility] office. Out of respect for other patients and providers, you may be asked to reschedule your appointment if you arrive later than your scheduled appointment time. Appointments cancelled less than 24hrs in advance will be considered a no show. Patients with three missed appointments within 1 year or four missed appointments within 2 years can be dismissed from the practice. Please be aware that our physicians are required to work in the Intensive Care Unit at [facility].  Your appointment may need to be rescheduled if they are designated to work during your appointment time. You may receive a survey regarding the care you received during your visit. Your input is valuable to us. We encourage you to complete and return your survey. We hope you will choose us in the future for your healthcare needs. Pt instructed of all future appointment dates & times, including radiology, labs, procedures & referrals.  If procedures were

## 2023-12-26 PROBLEM — G47.19 EXCESSIVE DAYTIME SLEEPINESS: Status: ACTIVE | Noted: 2023-12-26

## 2024-01-09 NOTE — PROGRESS NOTES
PULMONARY CLINIC NOTE      Truong Helms   : 1985  MRN: [id number]     Date of Service: 2024    PCP: Renea Curtis DO    Referring provider: No ref. provider found      Chief Complaint   Patient presents with    Sleep Apnea     Patient's visit today is to discuss sleep study results           ASSESSMENT & PLAN       38 y.o. pleasant  male patient with:    1. GUILLE (obstructive sleep apnea)             # GUILLE, severe with nocturnal hypoxia  Snoring, daytime sleepiness, gasping for air.  Scored 6 on STOP-BANG questionnaire.  ESS 11.  Neck circumference 18.  HSAT 2023: of 34, lowest oxygen saturation 76% and hypoxia time of 30 minutes.  Discussed results of the sleep study with the patient.  Education provided on sleep apnea, contributing factors, benefits of treatment and risks of untreated sleep apnea.  Sleep hygiene, CPAP cleaning, desensitization, safety precautions education provided.  Auto CPAP ordered  Follow-up in  2 to 3 months    # Elevated blood pressure in the clinic today.  Likely multifactorial including energy drink, whitecoat hypertension, anxiety.  Blood pressure is usually lower at home  Recommended to recheck blood pressure at home and report to PCP if remains elevated.    # Chronic morning headaches/migraine  # Anxiety  Undiagnosed/untreated sleep apnea could be contributing to lack of control    # Metabolic syndrome components: Obesity class III (BMI 45), hypertension  Targeting healthy weight is encouraged. Healthy weight can help treat sleep apnea, decrease breathing difficulties and respiratory illness exacerbations.  Currently on weight loss medications    #Nicotine dependence history  13 PY. Quit date:   LDCT: Not indicated.    Other medical Hx: has a past medical history of Anxiety, Chronic back pain, and Heartburn.        Health Maintenance/Preventive measures:        >>  Avoid exposure to tobacco, irritants, allergens as possible as well as contact with patients

## 2024-01-12 DIAGNOSIS — E11.9 TYPE 2 DIABETES MELLITUS WITHOUT COMPLICATION, WITHOUT LONG-TERM CURRENT USE OF INSULIN (HCC): ICD-10-CM

## 2024-01-15 RX ORDER — DULAGLUTIDE 4.5 MG/.5ML
4.5 INJECTION, SOLUTION SUBCUTANEOUS WEEKLY
Qty: 4 ADJUSTABLE DOSE PRE-FILLED PEN SYRINGE | Refills: 2 | Status: SHIPPED | OUTPATIENT
Start: 2024-01-15 | End: 2024-01-17 | Stop reason: SDUPTHER

## 2024-01-15 NOTE — TELEPHONE ENCOUNTER
Refill Request   Return for annual physical.    Last Seen: Last Seen Department: 12/4/2023  Last Seen by PCP: 12/4/2023    Last Written: 10/16/23 4 with 2    Next Appointment:   Future Appointments   Date Time Provider Department Center   1/31/2024  8:20 AM Bryce Maravilla MD [facility]   2/16/2024  3:20 PM Renea Curtis, DO [facility]         Requested Prescriptions     Pending Prescriptions Disp Refills    TRULICITY 4.5 MG/0.5ML SOPN [Pharmacy Med Name: TRULICITY 4.5 MG/0.5 ML PEN]  2     Sig: INJECT 4.5 MG INTO THE SKIN ONCE A WEEK

## 2024-01-17 DIAGNOSIS — E11.9 TYPE 2 DIABETES MELLITUS WITHOUT COMPLICATION, WITHOUT LONG-TERM CURRENT USE OF INSULIN (HCC): ICD-10-CM

## 2024-01-17 RX ORDER — DULAGLUTIDE 4.5 MG/.5ML
4.5 INJECTION, SOLUTION SUBCUTANEOUS WEEKLY
Qty: 4 ADJUSTABLE DOSE PRE-FILLED PEN SYRINGE | Refills: 2 | Status: SHIPPED | OUTPATIENT
Start: 2024-01-17

## 2024-01-31 ENCOUNTER — TELEMEDICINE (OUTPATIENT)
Dept: PULMONOLOGY | Age: 39
End: 2024-01-31
Payer: COMMERCIAL

## 2024-01-31 DIAGNOSIS — G47.33 OSA (OBSTRUCTIVE SLEEP APNEA): Primary | ICD-10-CM

## 2024-01-31 PROCEDURE — 99214 OFFICE O/P EST MOD 30 MIN: CPT | Performed by: INTERNAL MEDICINE

## 2024-01-31 ASSESSMENT — SLEEP AND FATIGUE QUESTIONNAIRES
HOW LIKELY ARE YOU TO NOD OFF OR FALL ASLEEP WHILE LYING DOWN TO REST IN THE AFTERNOON WHEN CIRCUMSTANCES PERMIT: 2
ESS TOTAL SCORE: 6
HOW LIKELY ARE YOU TO NOD OFF OR FALL ASLEEP WHEN YOU ARE A PASSENGER IN A CAR FOR AN HOUR WITHOUT A BREAK: 2
HOW LIKELY ARE YOU TO NOD OFF OR FALL ASLEEP WHILE SITTING AND TALKING TO SOMEONE: 0
HOW LIKELY ARE YOU TO NOD OFF OR FALL ASLEEP WHILE SITTING INACTIVE IN A PUBLIC PLACE: 0
HOW LIKELY ARE YOU TO NOD OFF OR FALL ASLEEP WHILE SITTING AND READING: 0
HOW LIKELY ARE YOU TO NOD OFF OR FALL ASLEEP IN A CAR, WHILE STOPPED FOR A FEW MINUTES IN TRAFFIC: 0
HOW LIKELY ARE YOU TO NOD OFF OR FALL ASLEEP WHILE WATCHING TV: 2
HOW LIKELY ARE YOU TO NOD OFF OR FALL ASLEEP WHILE SITTING QUIETLY AFTER LUNCH WITHOUT ALCOHOL: 0

## 2024-01-31 NOTE — PATIENT INSTRUCTIONS
Discussed results of the sleep study with the patient.  Education provided on sleep apnea, contributing factors, benefits of treatment and risks of untreated sleep apnea.  Sleep hygiene, CPAP cleaning, desensitization, safety precautions education provided.  Auto CPAP ordered  Follow-up in  2 to 3 months        Health Maintenance/Preventive measures:        >>  Avoid exposure to tobacco, irritants, allergens as possible as well as contact with patients with infectious respiratory illness.        >>  Stay up-to-date with influenza & pneumonia vaccines, RSV, & COVID-19 vaccine as recommended by the Advisory Committee on Immunization Practices (ACIP)        >>  Healthy diet and activity as able.        >>  Acid reflux precautions: Head of bed elevation, avoiding tight clothes, avoiding big meals or snacking 3 hours before bedtime, targeting healthy weight.        >>  Practice sleep hygiene measures. Avoid driving or operating heavy machines if tired or sleepy.

## 2024-01-31 NOTE — PROGRESS NOTES
MA Communication:  The following orders are received by verbal communication from Bryce Maravilla MD    Orders include:    DME list sent to the patient  Sleep apnea info sent to patient  31-90 day follow up

## 2024-03-16 ASSESSMENT — PATIENT HEALTH QUESTIONNAIRE - PHQ9
1. LITTLE INTEREST OR PLEASURE IN DOING THINGS: NOT AT ALL
SUM OF ALL RESPONSES TO PHQ9 QUESTIONS 1 & 2: 0
SUM OF ALL RESPONSES TO PHQ QUESTIONS 1-9: 0
1. LITTLE INTEREST OR PLEASURE IN DOING THINGS: NOT AT ALL
SUM OF ALL RESPONSES TO PHQ QUESTIONS 1-9: 0
SUM OF ALL RESPONSES TO PHQ QUESTIONS 1-9: 0
2. FEELING DOWN, DEPRESSED OR HOPELESS: NOT AT ALL
SUM OF ALL RESPONSES TO PHQ9 QUESTIONS 1 & 2: 0
2. FEELING DOWN, DEPRESSED OR HOPELESS: NOT AT ALL
SUM OF ALL RESPONSES TO PHQ QUESTIONS 1-9: 0

## 2024-03-19 ENCOUNTER — OFFICE VISIT (OUTPATIENT)
Dept: PRIMARY CARE CLINIC | Age: 39
End: 2024-03-19
Payer: COMMERCIAL

## 2024-03-19 VITALS
SYSTOLIC BLOOD PRESSURE: 128 MMHG | WEIGHT: 304 LBS | HEART RATE: 126 BPM | OXYGEN SATURATION: 96 % | DIASTOLIC BLOOD PRESSURE: 78 MMHG | TEMPERATURE: 98.1 F | BODY MASS INDEX: 42.56 KG/M2 | HEIGHT: 71 IN

## 2024-03-19 DIAGNOSIS — E11.9 TYPE 2 DIABETES MELLITUS WITHOUT COMPLICATION, WITHOUT LONG-TERM CURRENT USE OF INSULIN (HCC): Primary | ICD-10-CM

## 2024-03-19 DIAGNOSIS — G43.009 MIGRAINE WITHOUT AURA AND WITHOUT STATUS MIGRAINOSUS, NOT INTRACTABLE: ICD-10-CM

## 2024-03-19 DIAGNOSIS — G47.33 OSA (OBSTRUCTIVE SLEEP APNEA): ICD-10-CM

## 2024-03-19 DIAGNOSIS — E66.01 CLASS 3 SEVERE OBESITY DUE TO EXCESS CALORIES WITHOUT SERIOUS COMORBIDITY WITH BODY MASS INDEX (BMI) OF 40.0 TO 44.9 IN ADULT (HCC): ICD-10-CM

## 2024-03-19 DIAGNOSIS — Q61.3 POLYCYSTIC KIDNEY DISEASE: ICD-10-CM

## 2024-03-19 DIAGNOSIS — J02.9 SORE THROAT: ICD-10-CM

## 2024-03-19 DIAGNOSIS — J06.9 VIRAL UPPER RESPIRATORY TRACT INFECTION: ICD-10-CM

## 2024-03-19 LAB — S PYO AG THROAT QL: NORMAL

## 2024-03-19 PROCEDURE — 99214 OFFICE O/P EST MOD 30 MIN: CPT

## 2024-03-19 PROCEDURE — 87880 STREP A ASSAY W/OPTIC: CPT

## 2024-03-19 RX ORDER — CETIRIZINE HYDROCHLORIDE 10 MG/1
10 TABLET ORAL DAILY
Qty: 90 TABLET | Refills: 1 | Status: SHIPPED | OUTPATIENT
Start: 2024-03-19

## 2024-03-19 RX ORDER — FLUTICASONE PROPIONATE 50 MCG
2 SPRAY, SUSPENSION (ML) NASAL DAILY
Qty: 48 G | Refills: 1 | Status: SHIPPED | OUTPATIENT
Start: 2024-03-19

## 2024-03-19 RX ORDER — BENZONATATE 200 MG/1
200 CAPSULE ORAL 3 TIMES DAILY PRN
Qty: 30 CAPSULE | Refills: 0 | Status: SHIPPED | OUTPATIENT
Start: 2024-03-19 | End: 2024-03-26

## 2024-03-19 RX ORDER — GUAIFENESIN 600 MG/1
1200 TABLET, EXTENDED RELEASE ORAL 2 TIMES DAILY
Qty: 40 TABLET | Refills: 0 | Status: SHIPPED | OUTPATIENT
Start: 2024-03-19 | End: 2024-03-29

## 2024-03-19 SDOH — ECONOMIC STABILITY: FOOD INSECURITY: WITHIN THE PAST 12 MONTHS, THE FOOD YOU BOUGHT JUST DIDN'T LAST AND YOU DIDN'T HAVE MONEY TO GET MORE.: NEVER TRUE

## 2024-03-19 SDOH — ECONOMIC STABILITY: FOOD INSECURITY: WITHIN THE PAST 12 MONTHS, YOU WORRIED THAT YOUR FOOD WOULD RUN OUT BEFORE YOU GOT MONEY TO BUY MORE.: NEVER TRUE

## 2024-03-19 SDOH — ECONOMIC STABILITY: INCOME INSECURITY: HOW HARD IS IT FOR YOU TO PAY FOR THE VERY BASICS LIKE FOOD, HOUSING, MEDICAL CARE, AND HEATING?: NOT HARD AT ALL

## 2024-03-19 NOTE — PROGRESS NOTES
PROGRESS NOTE   [facility]                                  [address], [address], [facility] [address]         Phone: [phone]    Date of Service:  3/19/2024     Patient ID: .Truong Helms is a 38 y.o. male      Subjective:     CC: sleep apnea, headaches, T2DM, ADPKD, obesity     HPI  Truong Helms is a 38 y.o. male who presents for care of the above. Saw sleep medicine and completed sleep study which was consistent with GUILLE. Started on CPAP and will fu with sleep med in May. A1c improved to 4.9% 6/15/23 so metformin was stopped and pt continues on trulicity 4.5 mg/weekly monotherapy given additional weight loss benefit.  Also taking lisinopril and atorvastatin without issue.  Is doing intermittent fasting and eating roughly 1 meal a day plus a snack usually.  Does try to be mindful of diabetic diet.  He is due for eye exam. He has not been to see nephrology yet. Given sumatriptan at last visit to help with headache. Medicine has not helped significantly with headaches, still having a few times per week.      Sore throat x1 week, daughter and girlfriend positive for strep. Now having URI sxs x 4 days. Congestion, PND, productive cough with brown sputum. Cough worse laying flat or sleeping. No fever, chills, ear pain, SOB. Taking amazon cold and flu medicine, really only helps with sleep.     ROS:  ROS negative except for pertinent positives noted in HPI.         Vitals:    03/19/24 1407   BP: 128/78   Site: Left Upper Arm   Position: Sitting   Cuff Size: Medium Adult   Pulse: (!) 126   Temp: 98.1 °F (36.7 °C)   SpO2: 96%   Weight: (!) 137.9 kg (304 lb)   Height: 1.803 m (5' 11\")         Allergies:  Patient has no known allergies.    Outpatient Medications Marked as Taking for the 3/19/24 encounter (Office Visit) with Renea Curtis, DO   Medication Sig Dispense Refill    cetirizine (ZYRTEC) 10 MG tablet Take 1 tablet by mouth daily 90

## 2024-03-20 LAB — SARS-COV-2 N GENE RESP QL NAA+PROBE: NOT DETECTED

## 2024-03-22 LAB — BACTERIA THROAT AEROBE CULT: NORMAL

## 2024-03-27 ENCOUNTER — HOSPITAL ENCOUNTER (OUTPATIENT)
Age: 39
Discharge: HOME | End: 2024-03-27
Payer: COMMERCIAL

## 2024-03-27 DIAGNOSIS — E11.9 TYPE 2 DIABETES MELLITUS WITHOUT COMPLICATION, WITHOUT LONG-TERM CURRENT USE OF INSULIN (HCC): ICD-10-CM

## 2024-03-27 DIAGNOSIS — J02.9 SORE THROAT: ICD-10-CM

## 2024-03-27 LAB
ALBUMIN SERPL-MCNC: 4.4 G/DL (ref 3.4–5)
ALBUMIN/GLOB SERPL: 1.3 {RATIO} (ref 1.1–2.2)
ALP SERPL-CCNC: 71 U/L (ref 40–129)
ALT SERPL-CCNC: 18 U/L (ref 10–40)
ANION GAP SERPL CALCULATED.3IONS-SCNC: 12 MMOL/L (ref 3–16)
AST SERPL-CCNC: 19 U/L (ref 15–37)
BILIRUB SERPL-MCNC: 0.8 MG/DL (ref 0–1)
BUN SERPL-MCNC: 16 MG/DL (ref 7–20)
CALCIUM SERPL-MCNC: 9.5 MG/DL (ref 8.3–10.6)
CHLORIDE SERPL-SCNC: 99 MMOL/L (ref 99–110)
CHOLEST SERPL-MCNC: 114 MG/DL (ref 0–199)
CO2 SERPL-SCNC: 29 MMOL/L (ref 21–32)
CREAT SERPL-MCNC: 1.4 MG/DL (ref 0.9–1.3)
CREAT UR-MCNC: 334.5 MG/DL (ref 39–259)
GFR SERPLBLD CREATININE-BSD FMLA CKD-EPI: 65 ML/MIN/{1.73_M2}
GLUCOSE SERPL-MCNC: 100 MG/DL (ref 70–99)
HDLC SERPL-MCNC: 30 MG/DL (ref 40–60)
HETEROPH AB BLD QL IA: NEGATIVE
LDLC SERPL CALC-MCNC: 55 MG/DL
MICROALBUMIN UR DL<=1MG/L-MCNC: 2.5 MG/DL
MICROALBUMIN/CREAT UR: 7.5 MG/G (ref 0–30)
POTASSIUM SERPL-SCNC: 3.8 MMOL/L (ref 3.5–5.1)
PROT SERPL-MCNC: 7.8 G/DL (ref 6.4–8.2)
SODIUM SERPL-SCNC: 140 MMOL/L (ref 136–145)
TRIGL SERPL-MCNC: 146 MG/DL (ref 0–150)
VLDLC SERPL CALC-MCNC: 29 MG/DL

## 2024-03-27 PROCEDURE — 83036 HEMOGLOBIN GLYCOSYLATED A1C: CPT

## 2024-03-27 PROCEDURE — 80053 COMPREHEN METABOLIC PANEL: CPT

## 2024-03-27 PROCEDURE — 80061 LIPID PANEL: CPT

## 2024-03-27 PROCEDURE — 86308 HETEROPHILE ANTIBODY SCREEN: CPT

## 2024-03-27 PROCEDURE — 82043 UR ALBUMIN QUANTITATIVE: CPT

## 2024-03-27 PROCEDURE — 36415 COLL VENOUS BLD VENIPUNCTURE: CPT

## 2024-03-27 PROCEDURE — 82570 ASSAY OF URINE CREATININE: CPT

## 2024-03-28 DIAGNOSIS — N17.9 AKI (ACUTE KIDNEY INJURY) (HCC): Primary | ICD-10-CM

## 2024-03-28 LAB
EST. AVERAGE GLUCOSE BLD GHB EST-MCNC: 102.5 MG/DL
HBA1C MFR BLD: 5.2 %

## 2024-04-02 ENCOUNTER — TELEPHONE (OUTPATIENT)
Dept: PRIMARY CARE CLINIC | Age: 39
End: 2024-04-02

## 2024-04-02 NOTE — TELEPHONE ENCOUNTER
Patient called to schedule his VV and he stated that he started the Lexapro and he states it makes him tired. He said about an hour after taking it he is asleep. Please advise.

## 2024-04-04 NOTE — TELEPHONE ENCOUNTER
Recommend switching to taking it before bed. It is a long acting medication so taking it at night won't have any effect on how it works in his body during the daytime.

## 2024-04-04 NOTE — TELEPHONE ENCOUNTER
LVM for the patient to call the office back. Also sent message to his [employer].

## 2024-04-16 NOTE — PROGRESS NOTES
PULMONARY CLINIC NOTE      Truong Helms   : 1985  MRN: [id number]     Date of Service: 2024    PCP: Renea Curtis DO    Referring provider: No ref. provider found      Chief Complaint   Patient presents with    Sleep Apnea     31-90 day follow up     This is a virtual/video visit. Patient is at home. Provider is in clinic office.  The patient agreed to bill for this visit.        ASSESSMENT & PLAN       39 y.o. pleasant  male patient with:    No diagnosis found.           # GUILLE, severe with nocturnal hypoxia  Snoring, daytime sleepiness, gasping for air.  Scored 6 on STOP-BANG questionnaire.  ESS 11.  Neck circumference 18.  HSAT 2023: 34, lowest oxygen saturation 76% and hypoxia time of 30 minutes.  CPAP compliance report discussed with the patient.  Patient showed good adherence to CPAP therapy with good control of apnea events and benefit from therapy  Continue CPAP therapy with the current sittings.  CPAP supplies will be ordered/renewed as needed.  Adjust humidity for comfort  Will consider overnight pulse oximetry on CPAP therapy to confirm resolution of nocturnal hypoxia next visit  Sleep hygiene, CPAP cleaning, desensitization, safety precautions education provided.  Follow-up in 6 months.    # Elevated blood pressure in the clinic.  Likely multifactorial including energy drink, whitecoat hypertension, anxiety.  Blood pressure is usually lower at home  Improved on follow-up testing as outpatient    # Chronic morning headaches/migraine  # Anxiety  Undiagnosed/untreated sleep apnea could be contributing to lack of control    # Metabolic syndrome components: Obesity class III (BMI 45), hypertension  Targeting healthy weight is encouraged. Healthy weight can help treat sleep apnea, decrease breathing difficulties and respiratory illness exacerbations.  Currently on weight loss medications    #Nicotine dependence history  13 PY. Quit date:   LDCT: Not indicated.    Health

## 2024-04-21 DIAGNOSIS — E11.9 TYPE 2 DIABETES MELLITUS WITHOUT COMPLICATION, WITHOUT LONG-TERM CURRENT USE OF INSULIN (HCC): ICD-10-CM

## 2024-04-21 NOTE — PROGRESS NOTES
disclaimer:  Much of this encounter note is electronic transcription/translation of spoken language to printed texts.  The electronic translation of spoken language may be erroneous, or at times, nonsensical words or phrases may be inadvertently transcribed.  Although I have reviewed the note for such errors, some may still exist.       Renea Curtis DO, PGY-3  [facility]

## 2024-04-22 ENCOUNTER — TELEMEDICINE (OUTPATIENT)
Dept: PRIMARY CARE CLINIC | Age: 39
End: 2024-04-22
Payer: COMMERCIAL

## 2024-04-22 DIAGNOSIS — F32.A ANXIETY AND DEPRESSION: Primary | ICD-10-CM

## 2024-04-22 DIAGNOSIS — F41.9 ANXIETY AND DEPRESSION: Primary | ICD-10-CM

## 2024-04-22 PROCEDURE — 99213 OFFICE O/P EST LOW 20 MIN: CPT

## 2024-04-22 RX ORDER — LISINOPRIL 5 MG/1
5 TABLET ORAL DAILY
Qty: 90 TABLET | Refills: 1 | Status: SHIPPED | OUTPATIENT
Start: 2024-04-22

## 2024-04-22 RX ORDER — ATORVASTATIN CALCIUM 10 MG/1
TABLET, FILM COATED ORAL
Qty: 90 TABLET | Refills: 1 | Status: SHIPPED | OUTPATIENT
Start: 2024-04-22

## 2024-04-22 NOTE — TELEPHONE ENCOUNTER
Refill Request     CONFIRM preferred pharmacy with the patient.    If Mail Order Rx - Pend for 90 day refill.      Last Seen: Last Seen Department: 3/19/2024  Last Seen by PCP: 3/19/2024    Last Written: 10/12/23; 10/13/23    If no future appointment scheduled:  Review the last OV with PCP and review information for follow-up visit,  Route STAFF MESSAGE with patient name to the  Pool for scheduling with the following information:            -  Timing of next visit           -  Visit type ie Physical, OV, etc           -  Diagnoses/Reason ie. COPD, HTN - Do not use MEDICATION, Follow-up or CHECK UP - Give reason for visit      Next Appointment:   Future Appointments   Date Time Provider Department Center   4/22/2024  1:20 PM Renea Curtis DO [facility]   5/7/2024 11:40 AM Bryce Maravilla MD [facility]   6/17/2024  2:00 PM Renea Curtis DO [facility]       Message sent to  to schedule appt with patient?  N/A      Requested Prescriptions     Pending Prescriptions Disp Refills    atorvastatin (LIPITOR) 10 MG tablet [Pharmacy Med Name: ATORVASTATIN 10 MG TABLET] 90 tablet 1     Sig: TAKE 1 TABLET BY MOUTH EVERY DAY    lisinopril (PRINIVIL;ZESTRIL) 5 MG tablet [Pharmacy Med Name: LISINOPRIL 5 MG TABLET] 90 tablet 1     Sig: TAKE 1 TABLET BY MOUTH EVERY DAY

## 2024-05-07 ENCOUNTER — TELEMEDICINE (OUTPATIENT)
Dept: PULMONOLOGY | Age: 39
End: 2024-05-07
Payer: COMMERCIAL

## 2024-05-07 DIAGNOSIS — G47.33 OSA (OBSTRUCTIVE SLEEP APNEA): Primary | ICD-10-CM

## 2024-05-07 PROCEDURE — 99213 OFFICE O/P EST LOW 20 MIN: CPT | Performed by: INTERNAL MEDICINE

## 2024-05-07 NOTE — PROGRESS NOTES
MA Communication:  The following orders are received by verbal communication from Bryce Maravilla MD    Orders include:    6 month GUILLE

## 2024-05-09 DIAGNOSIS — F41.9 ANXIETY: ICD-10-CM

## 2024-05-09 RX ORDER — ESCITALOPRAM OXALATE 10 MG/1
10 TABLET ORAL DAILY
Qty: 90 TABLET | Refills: 1 | Status: SHIPPED | OUTPATIENT
Start: 2024-05-09

## 2024-05-09 NOTE — TELEPHONE ENCOUNTER
Refill Request       Last Seen: Last Seen Department: 4/22/2024  Last Seen by PCP: 4/22/2024    Last Written: 3/29/24 30 with 3    Next Appointment:   Future Appointments   Date Time Provider Department Center   6/17/2024  2:00 PM Renea Curtis DO [facility]   11/7/2024  3:00 PM Bryce Maravilla MD [facility]     Requested Prescriptions     Pending Prescriptions Disp Refills    escitalopram (LEXAPRO) 10 MG tablet [Pharmacy Med Name: ESCITALOPRAM 10 MG TABLET] 90 tablet 1     Sig: TAKE 1 TABLET BY MOUTH EVERY DAY

## 2024-06-17 ENCOUNTER — TELEPHONE (OUTPATIENT)
Dept: PRIMARY CARE CLINIC | Age: 39
End: 2024-06-17

## 2024-06-17 NOTE — TELEPHONE ENCOUNTER
----- Message from Nisha Valenciaangelica sent at 6/17/2024 12:06 PM EDT -----  Regarding: [facility] Appointment Request  [facility] Appointment Request    Patient needs appointment for E[facility] Appointment Type: Annual Visit.    Reason for Appointment Request: No appointments available during search  Preferred Schedule : Any time next Week   Last AWV 3/19/2024  --------------------------------------------------------------------------------------------------------------------------    Relationship to Patient: Self     Call Back Information: OK to leave message on voicemail  Preferred Call Back Number: Phone [phone]

## 2024-07-02 ENCOUNTER — OFFICE VISIT (OUTPATIENT)
Dept: PRIMARY CARE CLINIC | Age: 39
End: 2024-07-02
Payer: COMMERCIAL

## 2024-07-02 VITALS
WEIGHT: 315 LBS | HEART RATE: 82 BPM | TEMPERATURE: 98.4 F | BODY MASS INDEX: 43.96 KG/M2 | DIASTOLIC BLOOD PRESSURE: 100 MMHG | SYSTOLIC BLOOD PRESSURE: 140 MMHG | OXYGEN SATURATION: 97 %

## 2024-07-02 DIAGNOSIS — F51.01 PRIMARY INSOMNIA: ICD-10-CM

## 2024-07-02 DIAGNOSIS — F41.9 ANXIETY: Primary | ICD-10-CM

## 2024-07-02 DIAGNOSIS — F41.0 PANIC ATTACK: ICD-10-CM

## 2024-07-02 PROCEDURE — 99214 OFFICE O/P EST MOD 30 MIN: CPT | Performed by: STUDENT IN AN ORGANIZED HEALTH CARE EDUCATION/TRAINING PROGRAM

## 2024-07-02 RX ORDER — ALPRAZOLAM 0.5 MG/1
0.5 TABLET ORAL 3 TIMES DAILY PRN
Qty: 10 TABLET | Refills: 0 | Status: CANCELLED | OUTPATIENT
Start: 2024-07-02 | End: 2024-08-01

## 2024-07-02 RX ORDER — ESCITALOPRAM OXALATE 20 MG/1
20 TABLET ORAL DAILY
Qty: 30 TABLET | Refills: 3 | Status: SHIPPED | OUTPATIENT
Start: 2024-07-02

## 2024-07-02 RX ORDER — ESZOPICLONE 3 MG/1
3 TABLET, FILM COATED ORAL NIGHTLY
Qty: 30 TABLET | Refills: 1 | Status: CANCELLED | OUTPATIENT
Start: 2024-07-02 | End: 2024-08-31

## 2024-07-02 RX ORDER — HYDROXYZINE HYDROCHLORIDE 25 MG/1
50 TABLET, FILM COATED ORAL EVERY 8 HOURS PRN
Qty: 90 TABLET | Refills: 0 | Status: SHIPPED | OUTPATIENT
Start: 2024-07-02 | End: 2024-08-01

## 2024-07-02 RX ORDER — TRAZODONE HYDROCHLORIDE 100 MG/1
100 TABLET ORAL NIGHTLY
Qty: 90 TABLET | Refills: 1 | Status: SHIPPED | OUTPATIENT
Start: 2024-07-02

## 2024-07-02 RX ORDER — ESZOPICLONE 3 MG/1
3 TABLET, FILM COATED ORAL NIGHTLY PRN
Qty: 30 TABLET | Refills: 0 | Status: CANCELLED | OUTPATIENT
Start: 2024-07-02 | End: 2024-08-01

## 2024-07-02 NOTE — PROGRESS NOTES
[facility]                                  [address], [address] [address]         Phone: [phone]    Date of Service:  7/2/2024    CC:  anxiety f/u    Subjective     HPI    The patient reports  - been on lexapro 10 mg for about 90 days  - still having anxiety, started since being  from significant other a few years ago, denies manic-like episodes or SI/HI/AVH  - has had panic attack last week, ave about 3 times per week  - describes panic attack as impending doom, shortness of breath, chest pain  - would like something for anxiety, panic attack, and insomnia  - difficulty with onset of sleep, sleep maintenance fine, melatonin did not help    ROS    Relevant ROS were mentioned in HPI    Vitals:    07/02/24 1503 07/02/24 1506   BP: (!) 142/98 (!) 140/100   Site: Left Upper Arm Left Upper Arm   Position: Sitting Sitting   Cuff Size: Large Adult Large Adult   Pulse: 82    Temp: 98.4 °F (36.9 °C)    TempSrc: Oral    SpO2: 97%    Weight: (!) 143 kg (315 lb 3.2 oz)        ALG  Patient has no known allergies.    Outpatient Medications Marked as Taking for the 7/2/24 encounter (Office Visit) with Lius Almanzar MD PhD   Medication Sig Dispense Refill    escitalopram (LEXAPRO) 20 MG tablet Take 1 tablet by mouth daily 30 tablet 3    traZODone (DESYREL) 100 MG tablet Take 1 tablet by mouth nightly 90 tablet 1    hydrOXYzine HCl (ATARAX) 25 MG tablet Take 2 tablets by mouth every 8 hours as needed for Anxiety 90 tablet 0       Objective     GEN  - reviewed vitals above  - well Nourished, well developed, no distress       HEENT  - no trouble breathing through nose  CV    - appears well-perfused  RESP  - normal effort, normal WOB.  - no visualized signs of difficulty breathing or respiratory distress  MSK  - normal Gait  - normal ROM of neck w/o pain  SKIN  - no rashes on inspection of facial skin  PSYCH  - normal mood and

## 2024-08-19 DIAGNOSIS — J06.9 VIRAL UPPER RESPIRATORY TRACT INFECTION: ICD-10-CM

## 2024-08-19 RX ORDER — CETIRIZINE HYDROCHLORIDE 10 MG/1
10 TABLET ORAL DAILY
Qty: 90 TABLET | Refills: 1 | Status: SHIPPED | OUTPATIENT
Start: 2024-08-19

## 2024-08-19 NOTE — TELEPHONE ENCOUNTER
Refill Request     Last Seen: 7/2/2024    Last Written: 03/19/24    Next Appointment:   Future Appointments   Date Time Provider Department Center   11/7/2024  3:00 PM Bryce Maravilla MD [facility]             Requested Prescriptions     Pending Prescriptions Disp Refills    cetirizine (ZYRTEC) 10 MG tablet 90 tablet 1     Sig: Take 1 tablet by mouth daily

## 2024-09-03 DIAGNOSIS — E11.9 TYPE 2 DIABETES MELLITUS WITHOUT COMPLICATION, WITHOUT LONG-TERM CURRENT USE OF INSULIN (HCC): ICD-10-CM

## 2024-09-03 RX ORDER — DULAGLUTIDE 4.5 MG/.5ML
4.5 INJECTION, SOLUTION SUBCUTANEOUS WEEKLY
Qty: 4 ADJUSTABLE DOSE PRE-FILLED PEN SYRINGE | Refills: 2 | Status: SHIPPED | OUTPATIENT
Start: 2024-09-03

## 2024-09-03 NOTE — TELEPHONE ENCOUNTER
Refill Request     Last Seen: 7/2/2024    Last Written: 01/17/2024    Next Appointment:   Future Appointments   Date Time Provider Department Center   11/7/2024  3:00 PM Bryce Maravilla MD [facility]             Requested Prescriptions      No prescriptions requested or ordered in this encounter

## 2024-10-31 DIAGNOSIS — E11.9 TYPE 2 DIABETES MELLITUS WITHOUT COMPLICATION, WITHOUT LONG-TERM CURRENT USE OF INSULIN (HCC): ICD-10-CM

## 2024-10-31 NOTE — TELEPHONE ENCOUNTER
Refill Request       Last Seen: Last Seen Department: 7/2/2024  Last Seen by PCP: Visit date not found    Last Written: atorvastatin (LIPITOR) 10 MG tablet -4/22/24 90 1 refill    lisinopril (PRINIVIL;ZESTRIL) 5 MG tablet   -4/22/24 90 1 refill  Next Appointment:   Future Appointments   Date Time Provider Department Center   11/7/2024  3:00 PM Bryce Maravilla MD [facility]             Requested Prescriptions     Pending Prescriptions Disp Refills    atorvastatin (LIPITOR) 10 MG tablet 90 tablet 1     Sig: Take 1 tablet by mouth daily    lisinopril (PRINIVIL;ZESTRIL) 5 MG tablet 90 tablet 1     Sig: Take 1 tablet by mouth daily

## 2024-11-01 RX ORDER — LISINOPRIL 5 MG/1
5 TABLET ORAL DAILY
Qty: 90 TABLET | Refills: 1 | Status: SHIPPED | OUTPATIENT
Start: 2024-11-01

## 2024-11-01 RX ORDER — ATORVASTATIN CALCIUM 10 MG/1
10 TABLET, FILM COATED ORAL DAILY
Qty: 90 TABLET | Refills: 1 | Status: SHIPPED | OUTPATIENT
Start: 2024-11-01

## 2024-11-07 ENCOUNTER — TELEMEDICINE (OUTPATIENT)
Dept: PULMONOLOGY | Age: 39
End: 2024-11-07

## 2024-11-07 DIAGNOSIS — G47.33 OSA (OBSTRUCTIVE SLEEP APNEA): Primary | ICD-10-CM

## 2024-11-07 RX ORDER — BUSPIRONE HYDROCHLORIDE 10 MG/1
TABLET ORAL
COMMUNITY
Start: 2024-11-07

## 2024-11-07 RX ORDER — MIRTAZAPINE 15 MG/1
TABLET, FILM COATED ORAL
COMMUNITY
Start: 2024-11-05

## 2024-11-07 ASSESSMENT — SLEEP AND FATIGUE QUESTIONNAIRES
HOW LIKELY ARE YOU TO NOD OFF OR FALL ASLEEP IN A CAR, WHILE STOPPED FOR A FEW MINUTES IN TRAFFIC: WOULD NEVER DOZE
HOW LIKELY ARE YOU TO NOD OFF OR FALL ASLEEP WHILE LYING DOWN TO REST IN THE AFTERNOON WHEN CIRCUMSTANCES PERMIT: SLIGHT CHANCE OF DOZING
HOW LIKELY ARE YOU TO NOD OFF OR FALL ASLEEP WHILE WATCHING TV: SLIGHT CHANCE OF DOZING
HOW LIKELY ARE YOU TO NOD OFF OR FALL ASLEEP WHILE WATCHING TV: SLIGHT CHANCE OF DOZING
HOW LIKELY ARE YOU TO NOD OFF OR FALL ASLEEP WHEN YOU ARE A PASSENGER IN A CAR FOR AN HOUR WITHOUT A BREAK: SLIGHT CHANCE OF DOZING
HOW LIKELY ARE YOU TO NOD OFF OR FALL ASLEEP WHEN YOU ARE A PASSENGER IN A CAR FOR AN HOUR WITHOUT A BREAK: SLIGHT CHANCE OF DOZING
HOW LIKELY ARE YOU TO NOD OFF OR FALL ASLEEP WHILE SITTING AND TALKING TO SOMEONE: WOULD NEVER DOZE
HOW LIKELY ARE YOU TO NOD OFF OR FALL ASLEEP WHILE SITTING QUIETLY AFTER LUNCH WITHOUT ALCOHOL: WOULD NEVER DOZE
HOW LIKELY ARE YOU TO NOD OFF OR FALL ASLEEP WHILE SITTING INACTIVE IN A PUBLIC PLACE: WOULD NEVER DOZE
HOW LIKELY ARE YOU TO NOD OFF OR FALL ASLEEP IN A CAR, WHILE STOPPED FOR A FEW MINUTES IN TRAFFIC: WOULD NEVER DOZE
HOW LIKELY ARE YOU TO NOD OFF OR FALL ASLEEP WHILE SITTING AND READING: SLIGHT CHANCE OF DOZING
HOW LIKELY ARE YOU TO NOD OFF OR FALL ASLEEP WHILE SITTING QUIETLY AFTER LUNCH WITHOUT ALCOHOL: WOULD NEVER DOZE
HOW LIKELY ARE YOU TO NOD OFF OR FALL ASLEEP WHILE SITTING AND READING: SLIGHT CHANCE OF DOZING
HOW LIKELY ARE YOU TO NOD OFF OR FALL ASLEEP WHILE SITTING AND TALKING TO SOMEONE: WOULD NEVER DOZE
HOW LIKELY ARE YOU TO NOD OFF OR FALL ASLEEP WHILE SITTING INACTIVE IN A PUBLIC PLACE: WOULD NEVER DOZE
ESS TOTAL SCORE: 4
HOW LIKELY ARE YOU TO NOD OFF OR FALL ASLEEP WHILE LYING DOWN TO REST IN THE AFTERNOON WHEN CIRCUMSTANCES PERMIT: SLIGHT CHANCE OF DOZING

## 2024-11-07 NOTE — PATIENT INSTRUCTIONS
CPAP compliance report discussed with the patient.  Patient showed excellent adherence to CPAP therapy with good control of apnea events and benefit from therapy  Continue CPAP therapy with the current sittings.  CPAP supplies will be ordered/renewed as needed.  Adjust humidity for comfort  Overnight pulse oximetry on CPAP therapy to confirm resolution of nocturnal hypoxia  Continue to practice sleep hygiene, CPAP cleaning and safety precautions.  Follow-up in 6 months.      Health Maintenance/Preventive measures:        >>  Avoid smoking, vaping or secondhand exposure.  Avoid exposure to irritants, allergens as possible as well as contact with patients with infectious respiratory illness.        >>  Stay up-to-date with influenza & pneumonia vaccines, RSV, & COVID-19 vaccine as recommended by the Advisory Committee on Immunization Practices (ACIP)        >>  Healthy diet and activity as able.        >>  Acid reflux precautions: Head of bed elevation, avoiding tight clothes, avoiding big meals or snacking 3 hours before bedtime, targeting healthy weight.        >>  Practice sleep hygiene measures. Avoid driving or operating heavy machines if tired or sleepy.

## 2024-11-08 NOTE — PROGRESS NOTES
MA Communication:  The following orders are received by verbal communication from Bryce Maravilla MD    Orders include:    Left message for the patient to call the office back to schedule a 6 month follow up    
sentences.        ________________________________________________________  Orders Placed This Encounter    Pulse oximetry, overnight     Scheduling Instructions:      On CPAP    busPIRone (BUSPAR) 10 MG tablet    mirtazapine (REMERON) 15 MG tablet      ________________________________________________________  Electronically signed by:  Bryce Maravilla MD,FACP    11/7/2024    3:16 PM.     [facility]  [address], [place] [address]   Phone (office): [phone]

## 2024-12-06 ENCOUNTER — PATIENT MESSAGE (OUTPATIENT)
Dept: PRIMARY CARE CLINIC | Age: 39
End: 2024-12-06

## 2024-12-06 RX ORDER — DULAGLUTIDE 4.5 MG/.5ML
4.5 INJECTION, SOLUTION SUBCUTANEOUS WEEKLY
Qty: 2 ML | Refills: 3 | Status: SHIPPED | OUTPATIENT
Start: 2024-12-06

## 2024-12-06 NOTE — TELEPHONE ENCOUNTER
Refill Request Trulicity it will not allow me to re-pend it     Last Seen: 7/2/2024    Last Written:  9/3/24    Next Appointment:   No future appointments.          Requested Prescriptions      No prescriptions requested or ordered in this encounter

## 2025-03-03 RX ORDER — DULAGLUTIDE 4.5 MG/.5ML
4.5 INJECTION, SOLUTION SUBCUTANEOUS WEEKLY
Qty: 2 ML | Refills: 0 | Status: SHIPPED | OUTPATIENT
Start: 2025-03-03

## 2025-03-03 NOTE — TELEPHONE ENCOUNTER
Refill Request       Last Seen: Last Seen Department: 7/2/2024  Last Seen by PCP: Visit date not found    Last Written: 12/6/24 2 mL with 3    Next Appointment:   No future appointments.    Requested Prescriptions     Pending Prescriptions Disp Refills    TRULICITY 4.5 MG/0.5ML SOAJ [Pharmacy Med Name: TRULICITY 4.5 MG/0.5 ML PEN]  1     Sig: INJECT 4.5 MG INTO THE SKIN ONCE A WEEK

## 2025-03-05 NOTE — TELEPHONE ENCOUNTER
Patient informed and is scheduled for an appointment. Pt requested a virtual visit, is that okay?

## 2025-03-31 RX ORDER — DULAGLUTIDE 4.5 MG/.5ML
4.5 INJECTION, SOLUTION SUBCUTANEOUS WEEKLY
Qty: 0.5 ML | Refills: 1 | Status: SHIPPED | OUTPATIENT
Start: 2025-03-31

## 2025-03-31 NOTE — TELEPHONE ENCOUNTER
Refill Request     Last Seen: 7/2/2024    Last Written: Ordered On: 03/03/2025   Disp-2 mL, R-0   Next Appointment:   Future Appointments   Date Time Provider Department Center   4/8/2025  3:00 PM Andrew Valdes, DO [facility]             Requested Prescriptions     Pending Prescriptions Disp Refills    TRULICITY 4.5 MG/0.5ML SOAJ [Pharmacy Med Name: TRULICITY 4.5 MG/0.5 ML PEN]       Sig: INJECT 4.5 MG INTO THE SKIN ONCE A WEEK

## 2025-04-23 DIAGNOSIS — E11.9 TYPE 2 DIABETES MELLITUS WITHOUT COMPLICATION, WITHOUT LONG-TERM CURRENT USE OF INSULIN (HCC): ICD-10-CM

## 2025-04-23 RX ORDER — ATORVASTATIN CALCIUM 10 MG/1
10 TABLET, FILM COATED ORAL DAILY
Qty: 90 TABLET | Refills: 1 | Status: SHIPPED | OUTPATIENT
Start: 2025-04-23

## 2025-04-23 RX ORDER — LISINOPRIL 5 MG/1
5 TABLET ORAL DAILY
Qty: 90 TABLET | Refills: 1 | Status: SHIPPED | OUTPATIENT
Start: 2025-04-23

## 2025-04-23 NOTE — TELEPHONE ENCOUNTER
Refill Request     Last Seen: 7/2/2024    Last Written: 11/01/2024    Next Appointment:   No future appointments.          Requested Prescriptions     Pending Prescriptions Disp Refills    atorvastatin (LIPITOR) 10 MG tablet [Pharmacy Med Name: ATORVASTATIN 10 MG TABLET] 90 tablet 1     Sig: TAKE 1 TABLET BY MOUTH EVERY DAY    lisinopril (PRINIVIL;ZESTRIL) 5 MG tablet [Pharmacy Med Name: LISINOPRIL 5 MG TABLET] 90 tablet 1     Sig: TAKE 1 TABLET BY MOUTH EVERY DAY

## 2025-06-13 RX ORDER — DULAGLUTIDE 4.5 MG/.5ML
4.5 INJECTION, SOLUTION SUBCUTANEOUS WEEKLY
Qty: 2 ML | Refills: 0 | Status: SHIPPED | OUTPATIENT
Start: 2025-06-13

## 2025-06-13 NOTE — TELEPHONE ENCOUNTER
Refill Request       Last Seen: Last Seen Department: 7/2/2024  Last Seen by PCP: Visit date not found    Last Written: 3/31/25 0.5 mL with 1    Next Appointment:   No future appointments.    Requested Prescriptions     Pending Prescriptions Disp Refills    TRULICITY 4.5 MG/0.5ML SOAJ [Pharmacy Med Name: TRULICITY 4.5 MG/0.5 ML PEN]  1     Sig: INJECT 4.5 MG INTO THE SKIN ONCE A WEEK

## 2025-07-16 RX ORDER — DULAGLUTIDE 4.5 MG/.5ML
4.5 INJECTION, SOLUTION SUBCUTANEOUS WEEKLY
Qty: 2 ML | Refills: 0 | Status: SHIPPED | OUTPATIENT
Start: 2025-07-16

## 2025-07-16 NOTE — TELEPHONE ENCOUNTER
Refill approved x 1, needs to schedule follow up visit.  Has been a year since we have seen him.

## 2025-07-16 NOTE — TELEPHONE ENCOUNTER
Refill Request       Last Seen: Last Seen Department: 7/2/2024  Last Seen by PCP: Visit date not found    Last Written: 6/13/25 2ml 0 refills    Next Appointment:   No future appointments.          Requested Prescriptions     Pending Prescriptions Disp Refills    TRULICITY 4.5 MG/0.5ML SOAJ [Pharmacy Med Name: TRULICITY 4.5 MG/0.5 ML PEN]       Sig: INJECT 4.5 MG INTO THE SKIN ONCE A WEEK

## 2025-07-22 ASSESSMENT — PATIENT HEALTH QUESTIONNAIRE - PHQ9
SUM OF ALL RESPONSES TO PHQ QUESTIONS 1-9: 4
5. POOR APPETITE OR OVEREATING: NOT AT ALL
6. FEELING BAD ABOUT YOURSELF - OR THAT YOU ARE A FAILURE OR HAVE LET YOURSELF OR YOUR FAMILY DOWN: NOT AT ALL
SUM OF ALL RESPONSES TO PHQ QUESTIONS 1-9: 4
4. FEELING TIRED OR HAVING LITTLE ENERGY: SEVERAL DAYS
8. MOVING OR SPEAKING SO SLOWLY THAT OTHER PEOPLE COULD HAVE NOTICED. OR THE OPPOSITE, BEING SO FIGETY OR RESTLESS THAT YOU HAVE BEEN MOVING AROUND A LOT MORE THAN USUAL: NOT AT ALL
10. IF YOU CHECKED OFF ANY PROBLEMS, HOW DIFFICULT HAVE THESE PROBLEMS MADE IT FOR YOU TO DO YOUR WORK, TAKE CARE OF THINGS AT HOME, OR GET ALONG WITH OTHER PEOPLE: NOT DIFFICULT AT ALL
2. FEELING DOWN, DEPRESSED OR HOPELESS: SEVERAL DAYS
SUM OF ALL RESPONSES TO PHQ QUESTIONS 1-9: 4
7. TROUBLE CONCENTRATING ON THINGS, SUCH AS READING THE NEWSPAPER OR WATCHING TELEVISION: NOT AT ALL
9. THOUGHTS THAT YOU WOULD BE BETTER OFF DEAD, OR OF HURTING YOURSELF: NOT AT ALL
1. LITTLE INTEREST OR PLEASURE IN DOING THINGS: SEVERAL DAYS
SUM OF ALL RESPONSES TO PHQ QUESTIONS 1-9: 4
3. TROUBLE FALLING OR STAYING ASLEEP: SEVERAL DAYS

## 2025-08-10 SDOH — ECONOMIC STABILITY: FOOD INSECURITY: WITHIN THE PAST 12 MONTHS, THE FOOD YOU BOUGHT JUST DIDN'T LAST AND YOU DIDN'T HAVE MONEY TO GET MORE.: NEVER TRUE

## 2025-08-10 SDOH — ECONOMIC STABILITY: INCOME INSECURITY: IN THE LAST 12 MONTHS, WAS THERE A TIME WHEN YOU WERE NOT ABLE TO PAY THE MORTGAGE OR RENT ON TIME?: NO

## 2025-08-10 SDOH — ECONOMIC STABILITY: FOOD INSECURITY: WITHIN THE PAST 12 MONTHS, YOU WORRIED THAT YOUR FOOD WOULD RUN OUT BEFORE YOU GOT MONEY TO BUY MORE.: NEVER TRUE

## 2025-08-10 SDOH — ECONOMIC STABILITY: TRANSPORTATION INSECURITY
IN THE PAST 12 MONTHS, HAS LACK OF TRANSPORTATION KEPT YOU FROM MEETINGS, WORK, OR FROM GETTING THINGS NEEDED FOR DAILY LIVING?: NO

## 2025-08-10 SDOH — ECONOMIC STABILITY: TRANSPORTATION INSECURITY
IN THE PAST 12 MONTHS, HAS THE LACK OF TRANSPORTATION KEPT YOU FROM MEDICAL APPOINTMENTS OR FROM GETTING MEDICATIONS?: NO

## 2025-08-11 ASSESSMENT — PATIENT HEALTH QUESTIONNAIRE - PHQ9
7. TROUBLE CONCENTRATING ON THINGS, SUCH AS READING THE NEWSPAPER OR WATCHING TELEVISION: NOT AT ALL
SUM OF ALL RESPONSES TO PHQ QUESTIONS 1-9: 4
3. TROUBLE FALLING OR STAYING ASLEEP: SEVERAL DAYS
1. LITTLE INTEREST OR PLEASURE IN DOING THINGS: SEVERAL DAYS
5. POOR APPETITE OR OVEREATING: NOT AT ALL
2. FEELING DOWN, DEPRESSED OR HOPELESS: SEVERAL DAYS
8. MOVING OR SPEAKING SO SLOWLY THAT OTHER PEOPLE COULD HAVE NOTICED. OR THE OPPOSITE - BEING SO FIDGETY OR RESTLESS THAT YOU HAVE BEEN MOVING AROUND A LOT MORE THAN USUAL: NOT AT ALL
4. FEELING TIRED OR HAVING LITTLE ENERGY: SEVERAL DAYS
9. THOUGHTS THAT YOU WOULD BE BETTER OFF DEAD, OR OF HURTING YOURSELF: NOT AT ALL
6. FEELING BAD ABOUT YOURSELF - OR THAT YOU ARE A FAILURE OR HAVE LET YOURSELF OR YOUR FAMILY DOWN: NOT AT ALL
10. IF YOU CHECKED OFF ANY PROBLEMS, HOW DIFFICULT HAVE THESE PROBLEMS MADE IT FOR YOU TO DO YOUR WORK, TAKE CARE OF THINGS AT HOME, OR GET ALONG WITH OTHER PEOPLE: NOT DIFFICULT AT ALL

## 2025-08-13 RX ORDER — DULAGLUTIDE 4.5 MG/.5ML
4.5 INJECTION, SOLUTION SUBCUTANEOUS WEEKLY
Qty: 0.5 ML | Refills: 0 | Status: SHIPPED | OUTPATIENT
Start: 2025-08-13

## 2025-08-19 ENCOUNTER — OFFICE VISIT (OUTPATIENT)
Dept: PRIMARY CARE CLINIC | Age: 40
End: 2025-08-19
Payer: COMMERCIAL

## 2025-08-19 VITALS
DIASTOLIC BLOOD PRESSURE: 90 MMHG | RESPIRATION RATE: 16 BRPM | BODY MASS INDEX: 44.1 KG/M2 | OXYGEN SATURATION: 97 % | WEIGHT: 315 LBS | HEART RATE: 97 BPM | HEIGHT: 71 IN | SYSTOLIC BLOOD PRESSURE: 136 MMHG

## 2025-08-19 DIAGNOSIS — F41.9 ANXIETY: ICD-10-CM

## 2025-08-19 DIAGNOSIS — E11.9 TYPE 2 DIABETES MELLITUS WITHOUT COMPLICATION, WITHOUT LONG-TERM CURRENT USE OF INSULIN (HCC): Primary | ICD-10-CM

## 2025-08-19 DIAGNOSIS — G47.33 OSA (OBSTRUCTIVE SLEEP APNEA): ICD-10-CM

## 2025-08-19 DIAGNOSIS — I10 PRIMARY HYPERTENSION: ICD-10-CM

## 2025-08-19 DIAGNOSIS — E66.813 CLASS 3 SEVERE OBESITY DUE TO EXCESS CALORIES WITHOUT SERIOUS COMORBIDITY WITH BODY MASS INDEX (BMI) OF 40.0 TO 44.9 IN ADULT (HCC): ICD-10-CM

## 2025-08-19 PROCEDURE — 99214 OFFICE O/P EST MOD 30 MIN: CPT

## 2025-08-19 PROCEDURE — 3080F DIAST BP >= 90 MM HG: CPT

## 2025-08-19 PROCEDURE — 3075F SYST BP GE 130 - 139MM HG: CPT

## 2025-08-19 RX ORDER — DULAGLUTIDE 4.5 MG/.5ML
4.5 INJECTION, SOLUTION SUBCUTANEOUS WEEKLY
Qty: 0.5 ML | Refills: 3 | Status: SHIPPED | OUTPATIENT
Start: 2025-08-19

## 2025-08-19 RX ORDER — ATORVASTATIN CALCIUM 10 MG/1
10 TABLET, FILM COATED ORAL DAILY
Qty: 90 TABLET | Refills: 1 | Status: SHIPPED | OUTPATIENT
Start: 2025-08-19

## 2025-08-19 RX ORDER — LISINOPRIL 5 MG/1
5 TABLET ORAL DAILY
Qty: 90 TABLET | Refills: 1 | Status: SHIPPED | OUTPATIENT
Start: 2025-08-19

## 2025-08-19 SDOH — ECONOMIC STABILITY: INCOME INSECURITY: IN THE LAST 12 MONTHS, WAS THERE A TIME WHEN YOU WERE NOT ABLE TO PAY THE MORTGAGE OR RENT ON TIME?: NO

## 2025-08-19 SDOH — ECONOMIC STABILITY: FOOD INSECURITY: WITHIN THE PAST 12 MONTHS, YOU WORRIED THAT YOUR FOOD WOULD RUN OUT BEFORE YOU GOT MONEY TO BUY MORE.: SOMETIMES TRUE

## 2025-08-19 SDOH — ECONOMIC STABILITY: FOOD INSECURITY: WITHIN THE PAST 12 MONTHS, THE FOOD YOU BOUGHT JUST DIDN'T LAST AND YOU DIDN'T HAVE MONEY TO GET MORE.: SOMETIMES TRUE
